# Patient Record
Sex: MALE | Race: BLACK OR AFRICAN AMERICAN | NOT HISPANIC OR LATINO | Employment: OTHER | URBAN - METROPOLITAN AREA
[De-identification: names, ages, dates, MRNs, and addresses within clinical notes are randomized per-mention and may not be internally consistent; named-entity substitution may affect disease eponyms.]

---

## 2021-03-30 ENCOUNTER — TELEPHONE (OUTPATIENT)
Dept: ENDOSCOPY | Facility: HOSPITAL | Age: 76
End: 2021-03-30

## 2021-03-30 DIAGNOSIS — C49.A0 MALIGNANT GASTROINTESTINAL STROMAL TUMOR, UNSPECIFIED SITE: Primary | ICD-10-CM

## 2021-03-31 ENCOUNTER — TELEPHONE (OUTPATIENT)
Dept: SURGERY | Facility: CLINIC | Age: 76
End: 2021-03-31

## 2021-04-01 ENCOUNTER — TELEPHONE (OUTPATIENT)
Dept: ENDOSCOPY | Facility: HOSPITAL | Age: 76
End: 2021-04-01

## 2021-04-06 ENCOUNTER — TELEPHONE (OUTPATIENT)
Dept: SURGERY | Facility: CLINIC | Age: 76
End: 2021-04-06

## 2021-04-08 ENCOUNTER — TELEPHONE (OUTPATIENT)
Dept: ENDOSCOPY | Facility: HOSPITAL | Age: 76
End: 2021-04-08

## 2021-04-08 RX ORDER — LOSARTAN POTASSIUM 100 MG/1
50 TABLET ORAL DAILY
COMMUNITY

## 2021-04-08 RX ORDER — ALLOPURINOL 100 MG/1
150 TABLET ORAL DAILY
COMMUNITY

## 2021-04-08 RX ORDER — UBIDECARENONE 75 MG
500 CAPSULE ORAL 2 TIMES DAILY
COMMUNITY

## 2021-04-08 RX ORDER — DOXAZOSIN 8 MG/1
8 TABLET ORAL NIGHTLY
COMMUNITY

## 2021-04-08 RX ORDER — ATORVASTATIN CALCIUM 40 MG/1
20 TABLET, FILM COATED ORAL NIGHTLY
COMMUNITY

## 2021-04-08 RX ORDER — AMLODIPINE BESYLATE 10 MG/1
10 TABLET ORAL DAILY
COMMUNITY

## 2021-04-08 RX ORDER — FERROUS SULFATE 325(65) MG
325 TABLET ORAL 2 TIMES DAILY
COMMUNITY

## 2021-04-08 RX ORDER — FINASTERIDE 5 MG/1
5 TABLET, FILM COATED ORAL DAILY
COMMUNITY

## 2021-04-08 RX ORDER — TAMSULOSIN HYDROCHLORIDE 0.4 MG/1
0.4 CAPSULE ORAL DAILY
COMMUNITY

## 2021-04-08 RX ORDER — CHOLECALCIFEROL (VITAMIN D3) 25 MCG
1000 TABLET ORAL DAILY
COMMUNITY

## 2021-04-08 RX ORDER — HYDROCHLOROTHIAZIDE 25 MG/1
25 TABLET ORAL DAILY
COMMUNITY

## 2021-04-08 RX ORDER — DARBEPOETIN ALFA 10 UG/.4ML
INJECTION, SOLUTION INTRAVENOUS; SUBCUTANEOUS
COMMUNITY

## 2021-04-08 RX ORDER — ERGOCALCIFEROL 1.25 MG/1
50000 CAPSULE ORAL
COMMUNITY

## 2021-04-09 ENCOUNTER — TELEPHONE (OUTPATIENT)
Dept: ENDOSCOPY | Facility: HOSPITAL | Age: 76
End: 2021-04-09

## 2021-04-09 DIAGNOSIS — C49.A0 GASTROINTESTINAL STROMAL TUMOR (GIST): ICD-10-CM

## 2021-04-09 DIAGNOSIS — Z99.2 DIALYSIS PATIENT: Primary | ICD-10-CM

## 2021-04-17 ENCOUNTER — LAB VISIT (OUTPATIENT)
Dept: FAMILY MEDICINE | Facility: CLINIC | Age: 76
End: 2021-04-17
Payer: MEDICARE

## 2021-04-17 DIAGNOSIS — C49.A0 GASTROINTESTINAL STROMAL TUMOR (GIST): ICD-10-CM

## 2021-04-17 PROCEDURE — U0005 INFEC AGEN DETEC AMPLI PROBE: HCPCS | Performed by: FAMILY MEDICINE

## 2021-04-17 PROCEDURE — U0003 INFECTIOUS AGENT DETECTION BY NUCLEIC ACID (DNA OR RNA); SEVERE ACUTE RESPIRATORY SYNDROME CORONAVIRUS 2 (SARS-COV-2) (CORONAVIRUS DISEASE [COVID-19]), AMPLIFIED PROBE TECHNIQUE, MAKING USE OF HIGH THROUGHPUT TECHNOLOGIES AS DESCRIBED BY CMS-2020-01-R: HCPCS | Performed by: FAMILY MEDICINE

## 2021-04-18 LAB — SARS-COV-2 RNA RESP QL NAA+PROBE: NOT DETECTED

## 2021-04-20 ENCOUNTER — OFFICE VISIT (OUTPATIENT)
Dept: SURGERY | Facility: CLINIC | Age: 76
End: 2021-04-20
Payer: MEDICARE

## 2021-04-20 ENCOUNTER — ANESTHESIA (OUTPATIENT)
Dept: ENDOSCOPY | Facility: HOSPITAL | Age: 76
End: 2021-04-20
Payer: MEDICARE

## 2021-04-20 ENCOUNTER — ANESTHESIA EVENT (OUTPATIENT)
Dept: ENDOSCOPY | Facility: HOSPITAL | Age: 76
End: 2021-04-20
Payer: MEDICARE

## 2021-04-20 ENCOUNTER — HOSPITAL ENCOUNTER (OUTPATIENT)
Facility: HOSPITAL | Age: 76
Discharge: HOME | End: 2021-04-20
Attending: INTERNAL MEDICINE | Admitting: INTERNAL MEDICINE
Payer: MEDICARE

## 2021-04-20 VITALS
OXYGEN SATURATION: 98 % | BODY MASS INDEX: 27.47 KG/M2 | DIASTOLIC BLOOD PRESSURE: 64 MMHG | HEIGHT: 67 IN | HEART RATE: 93 BPM | WEIGHT: 175 LBS | TEMPERATURE: 98 F | SYSTOLIC BLOOD PRESSURE: 121 MMHG | RESPIRATION RATE: 18 BRPM

## 2021-04-20 VITALS
OXYGEN SATURATION: 97 % | SYSTOLIC BLOOD PRESSURE: 123 MMHG | RESPIRATION RATE: 17 BRPM | HEIGHT: 67 IN | WEIGHT: 183.69 LBS | DIASTOLIC BLOOD PRESSURE: 66 MMHG | BODY MASS INDEX: 28.83 KG/M2 | HEART RATE: 82 BPM

## 2021-04-20 DIAGNOSIS — Z99.2 ESRD ON PERITONEAL DIALYSIS: ICD-10-CM

## 2021-04-20 DIAGNOSIS — N18.6 ESRD ON PERITONEAL DIALYSIS: ICD-10-CM

## 2021-04-20 DIAGNOSIS — C49.A2 MALIGNANT GASTROINTESTINAL STROMAL TUMOR (GIST) OF STOMACH: Primary | ICD-10-CM

## 2021-04-20 DIAGNOSIS — C49.A0 GIST (GASTROINTESTINAL STROMAL TUMOR), MALIGNANT: ICD-10-CM

## 2021-04-20 PROBLEM — E78.2 MIXED HYPERLIPIDEMIA: Status: ACTIVE | Noted: 2021-04-20

## 2021-04-20 PROBLEM — I15.0 RENOVASCULAR HYPERTENSION: Status: ACTIVE | Noted: 2021-04-20

## 2021-04-20 PROCEDURE — 99205 OFFICE O/P NEW HI 60 MIN: CPT | Mod: S$GLB,,, | Performed by: NURSE PRACTITIONER

## 2021-04-20 PROCEDURE — 37000008 HC ANESTHESIA 1ST 15 MINUTES: Performed by: INTERNAL MEDICINE

## 2021-04-20 PROCEDURE — 25000003 PHARM REV CODE 250: Performed by: NURSE ANESTHETIST, CERTIFIED REGISTERED

## 2021-04-20 PROCEDURE — 99999 PR PBB SHADOW E&M-EST. PATIENT-LVL IV: ICD-10-PCS | Mod: PBBFAC,,, | Performed by: NURSE PRACTITIONER

## 2021-04-20 PROCEDURE — 37000009 HC ANESTHESIA EA ADD 15 MINS: Performed by: INTERNAL MEDICINE

## 2021-04-20 PROCEDURE — 43259 PR ENDOSCOPIC ULTRASOUND EXAM: ICD-10-PCS | Mod: ,,, | Performed by: INTERNAL MEDICINE

## 2021-04-20 PROCEDURE — 63600175 PHARM REV CODE 636 W HCPCS: Performed by: NURSE ANESTHETIST, CERTIFIED REGISTERED

## 2021-04-20 PROCEDURE — 43259 EGD US EXAM DUODENUM/JEJUNUM: CPT | Mod: ,,, | Performed by: INTERNAL MEDICINE

## 2021-04-20 PROCEDURE — 43236 UPPR GI SCOPE W/SUBMUC INJ: CPT | Mod: 59,,, | Performed by: INTERNAL MEDICINE

## 2021-04-20 PROCEDURE — 27201028 HC NEEDLE, SCLERO: Performed by: INTERNAL MEDICINE

## 2021-04-20 PROCEDURE — 43259 EGD US EXAM DUODENUM/JEJUNUM: CPT | Performed by: INTERNAL MEDICINE

## 2021-04-20 PROCEDURE — 1126F PR PAIN SEVERITY QUANTIFIED, NO PAIN PRESENT: ICD-10-PCS | Mod: S$GLB,,, | Performed by: NURSE PRACTITIONER

## 2021-04-20 PROCEDURE — D9220A PRA ANESTHESIA: ICD-10-PCS | Mod: ANES,,, | Performed by: STUDENT IN AN ORGANIZED HEALTH CARE EDUCATION/TRAINING PROGRAM

## 2021-04-20 PROCEDURE — 43236 UPPR GI SCOPE W/SUBMUC INJ: CPT | Performed by: INTERNAL MEDICINE

## 2021-04-20 PROCEDURE — 1126F AMNT PAIN NOTED NONE PRSNT: CPT | Mod: S$GLB,,, | Performed by: NURSE PRACTITIONER

## 2021-04-20 PROCEDURE — 99999 PR PBB SHADOW E&M-EST. PATIENT-LVL IV: CPT | Mod: PBBFAC,,, | Performed by: NURSE PRACTITIONER

## 2021-04-20 PROCEDURE — 43236 PR EGD, FLEX, W/SUBMUC INJECTION(S), ANY SUBSTANCE: ICD-10-PCS | Mod: 59,,, | Performed by: INTERNAL MEDICINE

## 2021-04-20 PROCEDURE — D9220A PRA ANESTHESIA: Mod: CRNA,,, | Performed by: NURSE ANESTHETIST, CERTIFIED REGISTERED

## 2021-04-20 PROCEDURE — D9220A PRA ANESTHESIA: Mod: ANES,,, | Performed by: STUDENT IN AN ORGANIZED HEALTH CARE EDUCATION/TRAINING PROGRAM

## 2021-04-20 PROCEDURE — 99205 PR OFFICE/OUTPT VISIT, NEW, LEVL V, 60-74 MIN: ICD-10-PCS | Mod: S$GLB,,, | Performed by: NURSE PRACTITIONER

## 2021-04-20 PROCEDURE — D9220A PRA ANESTHESIA: ICD-10-PCS | Mod: CRNA,,, | Performed by: NURSE ANESTHETIST, CERTIFIED REGISTERED

## 2021-04-20 RX ORDER — LANOLIN ALCOHOL/MO/W.PET/CERES
CREAM (GRAM) TOPICAL
COMMUNITY
Start: 2021-01-26

## 2021-04-20 RX ORDER — LIDOCAINE HYDROCHLORIDE 20 MG/ML
INJECTION INTRAVENOUS
Status: DISCONTINUED | OUTPATIENT
Start: 2021-04-20 | End: 2021-04-20

## 2021-04-20 RX ORDER — SODIUM CHLORIDE 0.9 % (FLUSH) 0.9 %
10 SYRINGE (ML) INJECTION
Status: DISCONTINUED | OUTPATIENT
Start: 2021-04-20 | End: 2021-04-20 | Stop reason: HOSPADM

## 2021-04-20 RX ORDER — POTASSIUM CHLORIDE 1.5 G/1.58G
POWDER, FOR SOLUTION ORAL
COMMUNITY
Start: 2020-10-23

## 2021-04-20 RX ORDER — ONDANSETRON 2 MG/ML
4 INJECTION INTRAMUSCULAR; INTRAVENOUS ONCE AS NEEDED
Status: DISCONTINUED | OUTPATIENT
Start: 2021-04-20 | End: 2021-04-20 | Stop reason: HOSPADM

## 2021-04-20 RX ORDER — METOCLOPRAMIDE 5 MG/1
TABLET ORAL
COMMUNITY
Start: 2021-01-26

## 2021-04-20 RX ORDER — PROPOFOL 10 MG/ML
VIAL (ML) INTRAVENOUS
Status: DISCONTINUED | OUTPATIENT
Start: 2021-04-20 | End: 2021-04-20

## 2021-04-20 RX ORDER — ONDANSETRON 2 MG/ML
INJECTION INTRAMUSCULAR; INTRAVENOUS
Status: DISCONTINUED | OUTPATIENT
Start: 2021-04-20 | End: 2021-04-20

## 2021-04-20 RX ORDER — PROPOFOL 10 MG/ML
VIAL (ML) INTRAVENOUS CONTINUOUS PRN
Status: DISCONTINUED | OUTPATIENT
Start: 2021-04-20 | End: 2021-04-20

## 2021-04-20 RX ORDER — PHENYLEPHRINE HYDROCHLORIDE 10 MG/ML
INJECTION INTRAVENOUS
Status: DISCONTINUED | OUTPATIENT
Start: 2021-04-20 | End: 2021-04-20

## 2021-04-20 RX ORDER — EPHEDRINE SULFATE 50 MG/ML
INJECTION, SOLUTION INTRAVENOUS
Status: DISCONTINUED | OUTPATIENT
Start: 2021-04-20 | End: 2021-04-20

## 2021-04-20 RX ORDER — GENTAMICIN SULFATE 1 MG/G
CREAM TOPICAL
COMMUNITY
Start: 2021-02-23

## 2021-04-20 RX ORDER — FENTANYL CITRATE 50 UG/ML
INJECTION, SOLUTION INTRAMUSCULAR; INTRAVENOUS
Status: DISCONTINUED | OUTPATIENT
Start: 2021-04-20 | End: 2021-04-20

## 2021-04-20 RX ADMIN — PROPOFOL 20 MG: 10 INJECTION, EMULSION INTRAVENOUS at 09:04

## 2021-04-20 RX ADMIN — PHENYLEPHRINE HYDROCHLORIDE 200 MCG: 10 INJECTION INTRAVENOUS at 09:04

## 2021-04-20 RX ADMIN — PHENYLEPHRINE HYDROCHLORIDE 300 MCG: 10 INJECTION INTRAVENOUS at 09:04

## 2021-04-20 RX ADMIN — PHENYLEPHRINE HYDROCHLORIDE 100 MCG: 10 INJECTION INTRAVENOUS at 09:04

## 2021-04-20 RX ADMIN — SODIUM CHLORIDE: 0.9 INJECTION, SOLUTION INTRAVENOUS at 09:04

## 2021-04-20 RX ADMIN — PROPOFOL 175 MCG/KG/MIN: 10 INJECTION, EMULSION INTRAVENOUS at 09:04

## 2021-04-20 RX ADMIN — PHENYLEPHRINE HYDROCHLORIDE 400 MCG: 10 INJECTION INTRAVENOUS at 09:04

## 2021-04-20 RX ADMIN — FENTANYL CITRATE 50 MCG: 50 INJECTION, SOLUTION INTRAMUSCULAR; INTRAVENOUS at 09:04

## 2021-04-20 RX ADMIN — ONDANSETRON 4 MG: 2 INJECTION, SOLUTION INTRAMUSCULAR; INTRAVENOUS at 09:04

## 2021-04-20 RX ADMIN — LIDOCAINE HYDROCHLORIDE 100 MG: 20 INJECTION, SOLUTION INTRAVENOUS at 09:04

## 2021-04-20 RX ADMIN — PROPOFOL 50 MG: 10 INJECTION, EMULSION INTRAVENOUS at 09:04

## 2021-04-20 RX ADMIN — EPHEDRINE SULFATE 10 MG: 50 INJECTION INTRAVENOUS at 09:04

## 2021-04-20 RX ADMIN — GLYCOPYRROLATE 0.2 MG: 0.2 INJECTION, SOLUTION INTRAMUSCULAR; INTRAVITREAL at 09:04

## 2021-04-27 ENCOUNTER — PATIENT MESSAGE (OUTPATIENT)
Dept: SURGERY | Facility: CLINIC | Age: 76
End: 2021-04-27

## 2021-12-17 ENCOUNTER — HOSPITAL ENCOUNTER (OUTPATIENT)
Dept: RADIOLOGY | Facility: HOSPITAL | Age: 76
Discharge: HOME | End: 2021-12-17
Attending: NURSE PRACTITIONER
Payer: MEDICARE

## 2021-12-17 DIAGNOSIS — C49.A2 MALIGNANT GASTROINTESTINAL STROMAL TUMOR (GIST) OF STOMACH: ICD-10-CM

## 2021-12-17 PROCEDURE — 74183 MRI ABD W/O CNTR FLWD CNTR: CPT | Mod: TC,PO

## 2021-12-17 PROCEDURE — 25500020 PHARM REV CODE 255: Mod: PO | Performed by: NURSE PRACTITIONER

## 2021-12-17 PROCEDURE — A9585 GADOBUTROL INJECTION: HCPCS | Mod: PO | Performed by: NURSE PRACTITIONER

## 2021-12-17 RX ORDER — GADOBUTROL 604.72 MG/ML
6.5 INJECTION INTRAVENOUS
Status: COMPLETED | OUTPATIENT
Start: 2021-12-17 | End: 2021-12-17

## 2021-12-17 RX ADMIN — GADOBUTROL 6.5 ML: 604.72 INJECTION INTRAVENOUS at 03:12

## 2021-12-20 ENCOUNTER — OFFICE VISIT (OUTPATIENT)
Dept: SURGERY | Facility: CLINIC | Age: 76
End: 2021-12-20
Payer: MEDICARE

## 2021-12-20 DIAGNOSIS — Z99.2 ESRD ON PERITONEAL DIALYSIS: ICD-10-CM

## 2021-12-20 DIAGNOSIS — N28.89 RENAL MASS, LEFT: ICD-10-CM

## 2021-12-20 DIAGNOSIS — N18.6 ESRD ON PERITONEAL DIALYSIS: ICD-10-CM

## 2021-12-20 DIAGNOSIS — C49.A2 MALIGNANT GASTROINTESTINAL STROMAL TUMOR (GIST) OF STOMACH: Primary | ICD-10-CM

## 2021-12-20 PROCEDURE — 99213 OFFICE O/P EST LOW 20 MIN: CPT | Mod: 95,,, | Performed by: NURSE PRACTITIONER

## 2021-12-20 PROCEDURE — 1159F PR MEDICATION LIST DOCUMENTED IN MEDICAL RECORD: ICD-10-PCS | Mod: CPTII,95,, | Performed by: NURSE PRACTITIONER

## 2021-12-20 PROCEDURE — 99213 PR OFFICE/OUTPT VISIT, EST, LEVL III, 20-29 MIN: ICD-10-PCS | Mod: 95,,, | Performed by: NURSE PRACTITIONER

## 2021-12-20 PROCEDURE — 1159F MED LIST DOCD IN RCRD: CPT | Mod: CPTII,95,, | Performed by: NURSE PRACTITIONER

## 2021-12-20 RX ORDER — LACTULOSE 10 G/15ML
SOLUTION ORAL; RECTAL
COMMUNITY
Start: 2021-08-03

## 2021-12-20 RX ORDER — CINACALCET 30 MG/1
TABLET, FILM COATED ORAL
COMMUNITY
Start: 2021-01-20

## 2021-12-20 NOTE — PROGRESS NOTES
The patient location is: home  The chief complaint leading to consultation is: GIST surveillance    Visit type: audiovisual    Face to Face time with patient: 25 minutes  30 minutes of total time spent on the encounter, which includes face to face time and non-face to face time preparing to see the patient (eg, review of tests), Obtaining and/or reviewing separately obtained history, Documenting clinical information in the electronic or other health record, Independently interpreting results (not separately reported) and communicating results to the patient/family/caregiver, or Care coordination (not separately reported).     Each patient to whom he or she provides medical services by telemedicine is:  (1) informed of the relationship between the physician and patient and the respective role of any other health care provider with respect to management of the patient; and (2) notified that he or she may decline to receive medical services by telemedicine and may withdraw from such care at any time.    Encounter Date:  2021    Patient ID: Kurtis Plata  Age:  76 y.o. :  1945    Chief Complaint:  followup of low grade stomach GIST     3/2021: Given he is symptom-free and low grade pathology, plan for surveillance with repeat MRI abd in 6 months    Interval History:  Mr. Plata remains at home in [place], [place] with his daughter, Chandni, who is a nurse. In 2021, there was incidental finding of small mass on lesser curvature of stomach. This was biopsied and pathology revealed low grade GIST, without worrisome features. He was last seen as initial consult with Dr. Juarez and decided to observe GIST given risk of surgery with peritoneal dialysis.    Mr. Plata remains on PD for ESRD, night cycle. He continues to have a decreased appetite, usually eats 2 meals daily. Denies N/V. Denies abd pain. He lost 5# weight. Remains afebrile.   Surveillance imaging revealed stomach lesion is unchanged from 3/2021.  However, there is a finding of L renal mass.     + abd sx: PD catheter placement 2019  + family hx of cancer - father and brother    New Data:  Imaging:  I personally reviewed the following images:  12/17/2021: MRI ABD:  1. Unchanged 19 mm enhancing mucosal mass along lesser curvature of stomach, since 3/17/2021. This may represent reportedly known gist tumor. Correlation with prior endoscopy and presumed biopsy results is requested.    2. Evidence of iron deposition throughout the reticuloendothelial system involving the liver, spleen, and bone marrow, unchanged. Correlation for primary or secondary hemosiderosis is requested.    3. 4.7 cm inferior left renal enhancing mass characteristic of solid neoplasm. This represents malignancy such as renal cell carcinoma until proven otherwise. Urologic consultation is recommended.    4. Cystic dysplasia of bilateral kidneys.      Past Medical History:   Diagnosis Date    Anemia     BPH (benign prostatic hyperplasia)     CKD (chronic kidney disease)     Colon polyps     Dialysis patient     daily peritoneal    Dysphagia     Esophagitis     ESRD (end stage renal disease)     Gallbladder sludge     Gastritis     Gastrointestinal stromal tumor (GIST)     GERD (gastroesophageal reflux disease)     Gout     Hiatal hernia     HTN (hypertension)     Hyperlipidemia     Iron deficiency     Nephrosclerosis     Overweight     Proteinuria     Renal cysts, acquired, bilateral     Sleep apnea     Vitamin D deficiency      Past Surgical History:   Procedure Laterality Date    COLONOSCOPY W/ POLYPECTOMY  06/2017    ENDOSCOPIC ULTRASOUND OF UPPER GASTROINTESTINAL TRACT N/A 4/20/2021    Procedure: ULTRASOUND, UPPER GI TRACT, ENDOSCOPIC;  Surgeon: Anmol Joe MD;  Location: [facility] ([address] [place] [address]);  Service: Endoscopy;  Laterality: N/A;  Covid-19 test 4/17/21 at [place] - pg  Serum K+ before procedure (dialysis patient) - pg    ESOPHAGOGASTRODUODENOSCOPY  02/16/2021     PERITONEAL CATHETER INSERTION      RENAL BIOPSY  04/12/2017    VASECTOMY       Current Outpatient Medications on File Prior to Visit   Medication Sig Dispense Refill    allopurinoL (ZYLOPRIM) 100 MG tablet Take 150 mg by mouth once daily.      amLODIPine (NORVASC) 10 MG tablet Take 10 mg by mouth once daily.      atorvastatin (LIPITOR) 40 MG tablet Take 20 mg by mouth every evening.      cyanocobalamin 500 MCG tablet Take 500 mcg by mouth 2 (two) times a day.      darbepoetin lillian in polysorbat (ARANESP, IN POLYSORBATE,) 10 mcg/0.4 mL Syrg Inject as directed. Monthly.      doxazosin (CARDURA) 8 MG Tab Take 8 mg by mouth every evening.      ergocalciferol (ERGOCALCIFEROL) 50,000 unit Cap Take 50,000 Units by mouth every 14 (fourteen) days.      ferrous sulfate (FEOSOL) 325 mg (65 mg iron) Tab tablet Take 325 mg by mouth 2 (two) times daily.      finasteride (PROSCAR) 5 mg tablet Take 5 mg by mouth once daily.      gentamicin (GARAMYCIN) 0.1 % cream APPLY SMALL AMOUNT TOPICALLY DAILY FOR INFECTION      hydroCHLOROthiazide (HYDRODIURIL) 25 MG tablet Take 25 mg by mouth once daily.      losartan (COZAAR) 100 MG tablet Take 50 mg by mouth once daily.      magnesium oxide (MAG-OX) 400 mg (241.3 mg magnesium) tablet TAKE ONE TABLET BY MOUTH DAILY AS A MAGNESIUM SUPPLEMENT      metoclopramide HCl (REGLAN) 5 MG tablet TAKE ONE TABLET BY MOUTH EVERY NIGHT AT BEDTIME FOR STOMACH      potassium chloride (KLOR-CON) 20 mEq Pack TAKE 2 PACKETS BY MOUTH TWICE A DAY AS A POTASSIUM SUPPLEMENT TAKE TWICE A DAY FOR 5 DAYS AND THEN ONE A DAY AFTERWARDS WITH YOUT HCTZ      tamsulosin (FLOMAX) 0.4 mg Cap Take 0.4 mg by mouth once daily.      vitamin D (VITAMIN D3) 1000 units Tab Take 1,000 Units by mouth once daily.       No current facility-administered medications on file prior to visit.     Review of patient's allergies indicates:  No Known Allergies    Family History:  His family history includes Cancer in his  brother and father; Kidney disease in his father.     Social History:   reports that he has quit smoking. He does not have any smokeless tobacco history on file. He reports previous alcohol use.     ROS:    Pertinent positive/negatives detailed in HPI, all other systems negative.     Physical Exam: virtual visit only  Constitutional:  Non-toxic, no acute distress.   Eyes:  Sclerae anicteric, gaze symmetrical  Neck:  Trachea midline,  FROM  Resp:  Easy work of breathing  Neuro:  No gross facial deficits  Psych:  Awake, alert, oriented.  Answers and asks questions appropriately      ICD-10-CM ICD-9-CM    1. Malignant gastrointestinal stromal tumor (GIST) of stomach  C49.A2 151.9    2. ESRD on peritoneal dialysis  N18.6 585.6     Z99.2 V45.11    3. Renal mass, left  N28.89 593.9 Ambulatory referral/consult to Urology      CANCELED: Ambulatory referral/consult to Urology   Plan   This 75 y/o gentleman with CKD has been on PD for past 2 years. He had incidental finding of small stomach low grade GIST in 2/2021. He remains asymptomatic and is not interested in surgery at this time as he is concerned about interrupting PD. Repeat MRI abd for surveillance in anther 6 months.   Refer to urology at [facility] to discuss L kidney mass. Please notify his daughter when appt is scheduled.     Questions were asked and answered to patient's satisfaction.  We discussed the need for continued clinical/radiographic/endoscopic follow-up.      Follow up in about 6 months (around 6/20/2022). with MRI Abd          Mady Mederos NP  Upper GI / Hepatobiliary Surgical Oncology  [facility] [place], [place]  Office: [phone]  Fax: [phone]

## 2022-01-08 ENCOUNTER — PATIENT MESSAGE (OUTPATIENT)
Dept: SURGERY | Facility: CLINIC | Age: 77
End: 2022-01-08
Payer: MEDICARE

## 2022-01-10 DIAGNOSIS — C49.A2 MALIGNANT GASTROINTESTINAL STROMAL TUMOR (GIST) OF STOMACH: Primary | ICD-10-CM

## 2022-01-10 DIAGNOSIS — C49.A0 MALIGNANT GASTROINTESTINAL STROMAL TUMOR, UNSPECIFIED SITE: ICD-10-CM

## 2022-01-10 PROBLEM — N28.89 RENAL MASS, LEFT: Status: ACTIVE | Noted: 2021-12-20

## 2022-01-10 NOTE — TELEPHONE ENCOUNTER
Return pt daughter call and informed her that her father has an appt with Dr Ochoa (urologist) on Wednesday 01/12/2022 @ 10:15am.

## 2022-01-12 ENCOUNTER — OFFICE VISIT (OUTPATIENT)
Dept: UROLOGY | Facility: CLINIC | Age: 77
End: 2022-01-12
Payer: MEDICARE

## 2022-01-12 ENCOUNTER — HOSPITAL ENCOUNTER (OUTPATIENT)
Dept: RADIOLOGY | Facility: HOSPITAL | Age: 77
Discharge: HOME | End: 2022-01-12
Attending: UROLOGY
Payer: MEDICARE

## 2022-01-12 DIAGNOSIS — Z99.2 ESRD ON PERITONEAL DIALYSIS: ICD-10-CM

## 2022-01-12 DIAGNOSIS — N28.89 RENAL MASS, LEFT: ICD-10-CM

## 2022-01-12 DIAGNOSIS — N28.89 RENAL MASS, LEFT: Primary | ICD-10-CM

## 2022-01-12 DIAGNOSIS — N18.6 ESRD ON PERITONEAL DIALYSIS: ICD-10-CM

## 2022-01-12 PROCEDURE — 1160F PR REVIEW ALL MEDS BY PRESCRIBER/CLIN PHARMACIST DOCUMENTED: ICD-10-PCS | Mod: CPTII,S$GLB,, | Performed by: UROLOGY

## 2022-01-12 PROCEDURE — 99999 PR PBB SHADOW E&M-EST. PATIENT-LVL IV: ICD-10-PCS | Mod: PBBFAC,,, | Performed by: UROLOGY

## 2022-01-12 PROCEDURE — 71250 CT THORAX DX C-: CPT | Mod: TC,PO

## 2022-01-12 PROCEDURE — 1159F PR MEDICATION LIST DOCUMENTED IN MEDICAL RECORD: ICD-10-PCS | Mod: CPTII,S$GLB,, | Performed by: UROLOGY

## 2022-01-12 PROCEDURE — 1101F PR PT FALLS ASSESS DOC 0-1 FALLS W/OUT INJ PAST YR: ICD-10-PCS | Mod: CPTII,S$GLB,, | Performed by: UROLOGY

## 2022-01-12 PROCEDURE — 3288F PR FALLS RISK ASSESSMENT DOCUMENTED: ICD-10-PCS | Mod: CPTII,S$GLB,, | Performed by: UROLOGY

## 2022-01-12 PROCEDURE — 99205 PR OFFICE/OUTPT VISIT, NEW, LEVL V, 60-74 MIN: ICD-10-PCS | Mod: S$GLB,,, | Performed by: UROLOGY

## 2022-01-12 PROCEDURE — 99999 PR PBB SHADOW E&M-EST. PATIENT-LVL IV: CPT | Mod: PBBFAC,,, | Performed by: UROLOGY

## 2022-01-12 PROCEDURE — 1160F RVW MEDS BY RX/DR IN RCRD: CPT | Mod: CPTII,S$GLB,, | Performed by: UROLOGY

## 2022-01-12 PROCEDURE — 1159F MED LIST DOCD IN RCRD: CPT | Mod: CPTII,S$GLB,, | Performed by: UROLOGY

## 2022-01-12 PROCEDURE — 3288F FALL RISK ASSESSMENT DOCD: CPT | Mod: CPTII,S$GLB,, | Performed by: UROLOGY

## 2022-01-12 PROCEDURE — 1101F PT FALLS ASSESS-DOCD LE1/YR: CPT | Mod: CPTII,S$GLB,, | Performed by: UROLOGY

## 2022-01-12 PROCEDURE — 99205 OFFICE O/P NEW HI 60 MIN: CPT | Mod: S$GLB,,, | Performed by: UROLOGY

## 2022-01-12 NOTE — PROGRESS NOTES
"  Subjective:       Patient ID: Kurtis Plata is a 76 y.o. male.    Chief Complaint:  Renal mass.      History of Present Illness  HPI  Patient is a 76 y.o. male who is new to our clinic and referred by Mady Mederos NP for evaluation of a renal mass.   Patient denies h/o hematuria.  Has lost some weight, but attributed to poor appetite.   Also has ESRD, 2/2 HTN.  On peritoneal dialysis.   Father had kidney cancer (had surgery for this, passed from "old-age" unrelated to kidney cancer), brother colon CA, niece w/breast CA.    He sees Dr. Shari Mcknight in [place] as his nephrologist.       Review of Systems  Review of Systems  All other systems reviewed and negative except pertinent positives noted in HPI.       Objective:     Physical Exam  Constitutional:       General: He is not in acute distress.     Appearance: He is well-developed and well-nourished.   HENT:      Head: Normocephalic and atraumatic.   Eyes:      General: No scleral icterus.  Neck:      Trachea: No tracheal deviation.   Pulmonary:      Effort: Pulmonary effort is normal. No respiratory distress.   Abdominal:       Neurological:      Mental Status: He is alert and oriented to person, place, and time.   Psychiatric:         Mood and Affect: Mood and affect normal.         Behavior: Behavior normal.         Thought Content: Thought content normal.         Judgment: Judgment normal.         Lab Review  No results found for: COLORU, SPECGRAV, PHUR, WBCUR, NITRITE, PROTEINUR, GLUCOSEUR, KETONESU, UROBILINOGEN, BILIRUBINUR, RBCUR      Assessment:        1. Renal mass, left    2. ESRD on peritoneal dialysis            Plan:     Renal mass, left  -     Ambulatory referral/consult to Urology  -     CT Chest Without Contrast; Future; Expected date: 01/12/2022    ESRD on peritoneal dialysis    - Long talk about renal mass and it's management.  Reviewed images.Discussed options including active surveillance, biopsy, minimally invasive techniques including " HFRA, cryo. Discussed open and laparascopic surgical approaches. Discussed partial and radical nephrectomy. Discussed surgery preparation, surgery, recuperation, recovery, exercise restrictions. Discussed risks, benefits, and complications. Answered questions and addressed their concerns.    -plan CT chest.   --I have explained the risk, benefits, and alternatives of the procedure in detail.  The risks including but not limited to bleeding, injury to adjacent structures including the spleen, liver, lung, pancreas, colon and intestines, blood vessels in the abdomen including the renal artery or renal vein, or need for conversion to open nephrectomy were explained to the patient in depth. The patient voices understanding and all questions have been answered. The patient agrees to proceed as planned with a left robotic retroperitoneal nephrectomy (possible conversion to open or transperitoneal).  Will discuss plan with nephrologist in [place] as to whether we pre-emptively place an HD catheter just in case PD needs to be stopped (due to violation of peritoneum during retroperitoneal surgery OR conversion to transperitoneal approach)      -continue PD for ESRD

## 2022-02-01 ENCOUNTER — TELEPHONE (OUTPATIENT)
Dept: UROLOGY | Facility: CLINIC | Age: 77
End: 2022-02-01
Payer: MEDICARE

## 2022-02-01 DIAGNOSIS — N28.89 RENAL MASS, LEFT: Primary | ICD-10-CM

## 2022-02-08 NOTE — ANESTHESIA PAT ROS NOTE
02/08/2022  Kurtis Plata is a 77 y.o., male.      Pre-op Assessment          Review of Systems         Anesthesia Assessment: Preoperative EQUATION    Planned Procedure: Procedure(s) (LRB):  XI ROBOTIC NEPHRECTOMY/ RETROPERITONEAL (Left)  Requested Anesthesia Type:General  Surgeon: Janes Ochoa MD  Service: Urology  Known or anticipated Date of Surgery:3/24/2022    Surgeon notes: reviewed    Previous anesthesia records:GETA and No problems   4/2021 Upper GI US  Airway/Jaw/Neck:  Airway Findings: Mouth Opening: Normal Tongue: Normal  General Airway Assessment: Adult  Mallampati: II  TM Distance: Normal, at least 6 cm  Jaw/Neck Findings:     Neck ROM: Normal ROM       Last PCP note: outside [facility]   Subspecialty notes: Nephrology, Urology    Other important co-morbidities:Per Epic: GERD, HLD, HTN and ESRD/CKD, Gout      Tests already available:  No recent tests.      Optimization:  Anesthesia Preop Clinic Assessment  Indicated.    Medical Opinion Indicated.           Plan:    Testing:  CMP, EKG, Hematology Profile and T&S   Pre-anesthesia  visit       Visit focus: concerns in complex and/or prolonged anesthesia, position other than supine     Consultation:IM Perioperative Hospitalist     Patient  has previously scheduled Medical Appointment: 3/14/2022    Navigation: Tests Scheduled.              Consults scheduled.             Results will be tracked by Preop Clinic.

## 2022-02-14 ENCOUNTER — TELEPHONE (OUTPATIENT)
Dept: PREADMISSION TESTING | Facility: HOSPITAL | Age: 77
End: 2022-02-14
Payer: MEDICARE

## 2022-02-14 NOTE — TELEPHONE ENCOUNTER
----- Message from Edilma Corcoran RN sent at 2/8/2022 11:43 AM CST -----  Surgery date: 3/24/2022  PreOp appt: 3/14/2022    Please call Pt and schedule the following preop appts:    Ziggy  POC  Lab  EKG    Thank you!  Edilma

## 2022-03-08 ENCOUNTER — TELEPHONE (OUTPATIENT)
Dept: UROLOGY | Facility: CLINIC | Age: 77
End: 2022-03-08
Payer: MEDICARE

## 2022-03-08 NOTE — TELEPHONE ENCOUNTER
Josi Hearn,RN notified.    ----- Message from Amada Izaguirre LPN sent at 3/8/2022  3:06 PM CST -----  Hello,        I have sent a msg and LM for Karina  and tried for hrs this PM to ask for assistance in changing and appt for a pt coming in from out of town to no avail.   I did not get a call back and when I did go another direction, I spoke to ALEXANDRA Liz  and I was hung up on after holding on for Carley.  I have moved our appts to 3/22 and am hoping that Dr Ochoa's appt can also be changed so that the pt will not have to make 2 trips from [place] to come in for preop.    Thanks

## 2022-03-13 PROBLEM — G47.33 OSA (OBSTRUCTIVE SLEEP APNEA): Status: ACTIVE | Noted: 2022-03-13

## 2022-03-13 PROBLEM — I10 PRIMARY HYPERTENSION: Status: ACTIVE | Noted: 2022-03-13

## 2022-03-13 PROBLEM — Z86.2 HISTORY OF IRON DEFICIENCY ANEMIA: Status: ACTIVE | Noted: 2022-03-13

## 2022-03-13 PROBLEM — N40.0 BENIGN PROSTATIC HYPERPLASIA: Status: ACTIVE | Noted: 2022-03-13

## 2022-03-14 ENCOUNTER — OFFICE VISIT (OUTPATIENT)
Dept: UROLOGY | Facility: CLINIC | Age: 77
End: 2022-03-14
Payer: MEDICARE

## 2022-03-14 ENCOUNTER — HOSPITAL ENCOUNTER (OUTPATIENT)
Dept: CARDIOLOGY | Facility: CLINIC | Age: 77
Discharge: HOME | End: 2022-03-14
Payer: MEDICARE

## 2022-03-14 ENCOUNTER — OFFICE VISIT (OUTPATIENT)
Dept: INTERNAL MEDICINE | Facility: CLINIC | Age: 77
End: 2022-03-14
Payer: MEDICARE

## 2022-03-14 ENCOUNTER — RESEARCH ENCOUNTER (OUTPATIENT)
Dept: RESEARCH | Facility: HOSPITAL | Age: 77
End: 2022-03-14
Payer: MEDICARE

## 2022-03-14 VITALS
WEIGHT: 178 LBS | TEMPERATURE: 99 F | OXYGEN SATURATION: 98 % | BODY MASS INDEX: 29.66 KG/M2 | DIASTOLIC BLOOD PRESSURE: 61 MMHG | HEIGHT: 65 IN | HEART RATE: 78 BPM | SYSTOLIC BLOOD PRESSURE: 127 MMHG

## 2022-03-14 DIAGNOSIS — Z01.818 PREOPERATIVE TESTING: ICD-10-CM

## 2022-03-14 DIAGNOSIS — E87.6 HYPOKALEMIA: ICD-10-CM

## 2022-03-14 DIAGNOSIS — N28.89 RENAL MASS: ICD-10-CM

## 2022-03-14 DIAGNOSIS — E78.2 MIXED HYPERLIPIDEMIA: ICD-10-CM

## 2022-03-14 DIAGNOSIS — E88.09 HYPOALBUMINEMIA: ICD-10-CM

## 2022-03-14 DIAGNOSIS — G47.33 OSA (OBSTRUCTIVE SLEEP APNEA): ICD-10-CM

## 2022-03-14 DIAGNOSIS — N28.89 RENAL MASS, LEFT: ICD-10-CM

## 2022-03-14 DIAGNOSIS — E55.9 VITAMIN D DEFICIENCY: ICD-10-CM

## 2022-03-14 DIAGNOSIS — I10 PRIMARY HYPERTENSION: ICD-10-CM

## 2022-03-14 DIAGNOSIS — C49.A2 MALIGNANT GASTROINTESTINAL STROMAL TUMOR (GIST) OF STOMACH: ICD-10-CM

## 2022-03-14 DIAGNOSIS — N40.0 BENIGN PROSTATIC HYPERPLASIA, UNSPECIFIED WHETHER LOWER URINARY TRACT SYMPTOMS PRESENT: ICD-10-CM

## 2022-03-14 DIAGNOSIS — E53.8 VITAMIN B 12 DEFICIENCY: ICD-10-CM

## 2022-03-14 DIAGNOSIS — Z86.2 HISTORY OF IRON DEFICIENCY ANEMIA: ICD-10-CM

## 2022-03-14 DIAGNOSIS — N18.6 ESRD ON PERITONEAL DIALYSIS: ICD-10-CM

## 2022-03-14 DIAGNOSIS — Z01.818 PREOPERATIVE TESTING: Primary | ICD-10-CM

## 2022-03-14 DIAGNOSIS — Z99.2 ESRD ON PERITONEAL DIALYSIS: ICD-10-CM

## 2022-03-14 PROBLEM — M10.30 GOUT DUE TO RENAL IMPAIRMENT, UNSPECIFIED SITE: Status: ACTIVE | Noted: 2020-12-30

## 2022-03-14 PROBLEM — E83.42 HYPOMAGNESEMIA: Status: ACTIVE | Noted: 2022-03-14

## 2022-03-14 PROCEDURE — 1159F PR MEDICATION LIST DOCUMENTED IN MEDICAL RECORD: ICD-10-PCS | Mod: CPTII,S$GLB,, | Performed by: NURSE PRACTITIONER

## 2022-03-14 PROCEDURE — 93010 ELECTROCARDIOGRAM REPORT: CPT | Mod: S$GLB,,, | Performed by: INTERNAL MEDICINE

## 2022-03-14 PROCEDURE — 93010 EKG 12-LEAD: ICD-10-PCS | Mod: S$GLB,,, | Performed by: INTERNAL MEDICINE

## 2022-03-14 PROCEDURE — 99999 PR PBB SHADOW E&M-EST. PATIENT-LVL III: ICD-10-PCS | Mod: PBBFAC,,,

## 2022-03-14 PROCEDURE — 99999 PR PBB SHADOW E&M-EST. PATIENT-LVL V: ICD-10-PCS | Mod: PBBFAC,,, | Performed by: NURSE PRACTITIONER

## 2022-03-14 PROCEDURE — 1159F MED LIST DOCD IN RCRD: CPT | Mod: CPTII,S$GLB,, | Performed by: NURSE PRACTITIONER

## 2022-03-14 PROCEDURE — 3078F PR MOST RECENT DIASTOLIC BLOOD PRESSURE < 80 MM HG: ICD-10-PCS | Mod: CPTII,S$GLB,, | Performed by: NURSE PRACTITIONER

## 2022-03-14 PROCEDURE — 99214 OFFICE O/P EST MOD 30 MIN: CPT | Mod: S$GLB,,, | Performed by: NURSE PRACTITIONER

## 2022-03-14 PROCEDURE — 99999 PR PBB SHADOW E&M-EST. PATIENT-LVL V: CPT | Mod: PBBFAC,,, | Performed by: NURSE PRACTITIONER

## 2022-03-14 PROCEDURE — 3074F SYST BP LT 130 MM HG: CPT | Mod: CPTII,S$GLB,, | Performed by: NURSE PRACTITIONER

## 2022-03-14 PROCEDURE — 99999 PR PBB SHADOW E&M-EST. PATIENT-LVL III: CPT | Mod: PBBFAC,,,

## 2022-03-14 PROCEDURE — 3078F DIAST BP <80 MM HG: CPT | Mod: CPTII,S$GLB,, | Performed by: NURSE PRACTITIONER

## 2022-03-14 PROCEDURE — 1160F RVW MEDS BY RX/DR IN RCRD: CPT | Mod: CPTII,S$GLB,, | Performed by: NURSE PRACTITIONER

## 2022-03-14 PROCEDURE — 99499 NO LOS: ICD-10-PCS | Mod: S$GLB,,, | Performed by: UROLOGY

## 2022-03-14 PROCEDURE — 99499 UNLISTED E&M SERVICE: CPT | Mod: S$GLB,,, | Performed by: UROLOGY

## 2022-03-14 PROCEDURE — 99214 PR OFFICE/OUTPT VISIT, EST, LEVL IV, 30-39 MIN: ICD-10-PCS | Mod: S$GLB,,, | Performed by: NURSE PRACTITIONER

## 2022-03-14 PROCEDURE — 93005 EKG 12-LEAD: ICD-10-PCS | Mod: S$GLB,,, | Performed by: ANESTHESIOLOGY

## 2022-03-14 PROCEDURE — 93005 ELECTROCARDIOGRAM TRACING: CPT | Mod: S$GLB,,, | Performed by: ANESTHESIOLOGY

## 2022-03-14 PROCEDURE — 3074F PR MOST RECENT SYSTOLIC BLOOD PRESSURE < 130 MM HG: ICD-10-PCS | Mod: CPTII,S$GLB,, | Performed by: NURSE PRACTITIONER

## 2022-03-14 PROCEDURE — 1160F PR REVIEW ALL MEDS BY PRESCRIBER/CLIN PHARMACIST DOCUMENTED: ICD-10-PCS | Mod: CPTII,S$GLB,, | Performed by: NURSE PRACTITIONER

## 2022-03-14 RX ORDER — SODIUM CHLORIDE 9 MG/ML
INJECTION, SOLUTION INTRAVENOUS CONTINUOUS
Status: CANCELLED | OUTPATIENT
Start: 2022-03-14

## 2022-03-14 RX ORDER — HEPARIN SODIUM 5000 [USP'U]/ML
5000 INJECTION, SOLUTION INTRAVENOUS; SUBCUTANEOUS ONCE
Status: CANCELLED | OUTPATIENT
Start: 2022-03-14

## 2022-03-14 NOTE — H&P (VIEW-ONLY)
"Urology ([facility]) H&P for upcoming procedure  Staff:  Janes Ochoa MD    CC: right renal mass    HPI:  Kurtis Plata is a 77 y.o. male with a left renal mass.      Patient denies h/o hematuria.  Has lost some weight, but attributed to poor appetite.   Also has ESRD, 2/2 HTN.  On peritoneal dialysis nightly.    Father had kidney cancer (had surgery for this, passed from "old-age" unrelated to kidney cancer), brother colon CA, niece w/breast CA.     He sees Dr. Shari Mcknight in [place] as his nephrologist.     Renal Nephrometry score - 10ah, high complexity, 21.9% likelihood of a major complication    MRI 12/17/21 4.7 cm inferior left renal enhancing mass characteristic of solid neoplasm. Unchanged 19 mm enhancing mucosal mass along lesser curvature of stomach, since 3/17/2021. This may represent reportedly known gist tumor.    CT chest 1/12/22 No evidence of metastatic disease throughout the chest.    He will be undergoing preoperative labs, EKG, and preoperative testing after her Urology appointment today    No abdominal surgeries other than abdominal peritoneal catheter placement. He will need to not use his peritoneal catheter for 4 weeks following surgery. Needs placement of Trialysis line or Permacath.    ROS: Negative except for as stated above    Past Medical History:   Diagnosis Date    Anemia     BPH (benign prostatic hyperplasia)     CKD (chronic kidney disease)     Colon polyps     Dialysis patient     daily peritoneal    Dysphagia     Esophagitis     ESRD (end stage renal disease)     Gallbladder sludge     Gastritis     Gastrointestinal stromal tumor (GIST)     GERD (gastroesophageal reflux disease)     Gout     Hiatal hernia     HTN (hypertension)     Hyperlipidemia     Iron deficiency     Nephrosclerosis     Overweight     Proteinuria     Renal cysts, acquired, bilateral     Sleep apnea     Vitamin D deficiency        Past Surgical History:   Procedure Laterality Date "    COLONOSCOPY W/ POLYPECTOMY  06/2017    ENDOSCOPIC ULTRASOUND OF UPPER GASTROINTESTINAL TRACT N/A 4/20/2021    Procedure: ULTRASOUND, UPPER GI TRACT, ENDOSCOPIC;  Surgeon: Anmol Joe MD;  Location: [facility] ([address] [place] [address]);  Service: Endoscopy;  Laterality: N/A;  Covid-19 test 4/17/21 at [place] - pg  Serum K+ before procedure (dialysis patient) - pg    ESOPHAGOGASTRODUODENOSCOPY  02/16/2021    PERITONEAL CATHETER INSERTION      RENAL BIOPSY  04/12/2017    VASECTOMY         Social History     Socioeconomic History    Marital status:    Tobacco Use    Smoking status: Former Smoker   Substance and Sexual Activity    Alcohol use: Not Currently       Family History   Problem Relation Age of Onset    Cancer Father     Kidney disease Father     Cancer Brother        Review of patient's allergies indicates:  No Known Allergies    Current Outpatient Medications on File Prior to Visit   Medication Sig Dispense Refill    allopurinoL (ZYLOPRIM) 100 MG tablet Take 150 mg by mouth once daily.      amLODIPine (NORVASC) 10 MG tablet Take 10 mg by mouth once daily.      atorvastatin (LIPITOR) 40 MG tablet Take 20 mg by mouth every evening.      cinacalcet (SENSIPAR) 30 MG Tab TAKE ONE TABLET BY MOUTH DAILY FOR CHRONIC KIDNEY DISEASE. TAKE WITH FOOD.      cyanocobalamin 500 MCG tablet Take 500 mcg by mouth 2 (two) times a day.      darbepoetin lillian in polysorbat (ARANESP, IN POLYSORBATE,) 10 mcg/0.4 mL Syrg Inject as directed. Monthly.      doxazosin (CARDURA) 8 MG Tab Take 8 mg by mouth every evening.      ergocalciferol (ERGOCALCIFEROL) 50,000 unit Cap Take 50,000 Units by mouth every 14 (fourteen) days.      ferrous sulfate (FEOSOL) 325 mg (65 mg iron) Tab tablet Take 325 mg by mouth 2 (two) times daily.      finasteride (PROSCAR) 5 mg tablet Take 5 mg by mouth once daily.      gentamicin (GARAMYCIN) 0.1 % cream APPLY SMALL AMOUNT TOPICALLY DAILY FOR INFECTION      hydroCHLOROthiazide (HYDRODIURIL)  "25 MG tablet Take 25 mg by mouth once daily.      lactulose (CHRONULAC) 10 gram/15 mL solution TAKE 20GM/30ML BY MOUTH EVERY 6 HOURS AS NEEDED FOR CONSTIPATION * TAKE 2 TABLESPOONFULS AS DIRECTED *      losartan (COZAAR) 100 MG tablet Take 50 mg by mouth once daily.      magnesium oxide (MAG-OX) 400 mg (241.3 mg magnesium) tablet TAKE ONE TABLET BY MOUTH DAILY AS A MAGNESIUM SUPPLEMENT      metoclopramide HCl (REGLAN) 5 MG tablet TAKE ONE TABLET BY MOUTH EVERY NIGHT AT BEDTIME FOR STOMACH      potassium chloride (KLOR-CON) 20 mEq Pack TAKE 2 PACKETS BY MOUTH TWICE A DAY AS A POTASSIUM SUPPLEMENT TAKE TWICE A DAY FOR 5 DAYS AND THEN ONE A DAY AFTERWARDS WITH YOUT HCTZ      tamsulosin (FLOMAX) 0.4 mg Cap Take 0.4 mg by mouth once daily.      vitamin D (VITAMIN D3) 1000 units Tab Take 1,000 Units by mouth once daily.       No current facility-administered medications on file prior to visit.       Anticoagulation:  None    Physical Exam:  Estimated body mass index is 28.04 kg/m² (pended) as calculated from the following:    Height as of 1/12/22: (P) 5' 7" (1.702 m).    Weight as of 1/12/22: (P) 81.2 kg (179 lb 0.2 oz).     General: No acute distress, well developed. AAOx3  Head: Normocephalic, Atraumatic  Eyes: Extra-occular movements intact, No discharge  Neck: supple, symmetrical, trachea midline  Lungs: normal respiratory effort, no respiratory distress, no wheezes  CV: regular rate, 2+ pulses  Abdomen: soft, non-tender, non-distended, no organomegaly. Peritoneal catheter LLQ   MSK: no edema, no deformities, normal ROM  Skin: skin color, texture, turgor normal.  Neurologic: no focal deficits, sensation intact     Labs:    Urine dipstick today shows negative for all components.          BMP  Lab Results   Component Value Date    K 3.9 04/20/2021       Imaging:   MRI AP from 12/17/21 - left renal mass, 4.7 cm, enhancing at lower pole, one left renal artery, one left renal veins.    Chest imaging: CXR 1/12/22 - " negative for metastatic disease    Assessment: Kurtis Plata is a 77 y.o. male with left renal mass, suspicious for [id number] RCC    Plan:     1. To OR on 3/24/22 for left retroperitoneal robotic nephrectomy  2. The patient is scheduled for a left retroperitoneal robotic nephrectomy, possible open. The risks and benefits of nephrectomy were discussed with the patient in detail. The risks include but are not limited to bleeding possibly requiring a blood transfusion, infection, pain, injury to internal organs such as lung, liver, spleen, bowel, adrenal, incomplete removal of tumor if present. Risks may vary depending on reasons for removal of kidney, persistent flank pain or hernia at area of incision, urinary infection requiring antibiotics, air embolus, pulmonary embolus and possible conversion to an open surgery. The patient will also be consented for blood. Patient understands these risks and has agreed to proceed with surgery. Consent was obtained.  3. Type and screen ordered preoperatively. The risks, benefits, and indications of a blood transfusion were discussed. The patient was given a chance to ask questions and all questions answered to his satisfaction. Consent obtained.   4. The risks and benefits of participating in our clinical trial have been discussed and the patient has consented for the research study here at [facility].  5. CBC, CMP, EKG, and preoperative appointment with Oliverio Monet today   6. He will need to have a permacath placed by Nephrology or a Trialysis line placed in the OR. He travon jnot be using his peritoneal catheter for 4 weeks s/p left nephrectomy    Keny Sexton MD

## 2022-03-14 NOTE — ASSESSMENT & PLAN NOTE
"  Dr. Juarez/ [facility]  Watching tumor- think it is slow growing  " incidental finding of small stomach low grade GIST in 2/2021. He remains asymptomatic and is not interested in surgery at this time as he is concerned about interrupting PD. Repeat MRI abd for surveillance in anther 6 months".     "

## 2022-03-14 NOTE — PROGRESS NOTES
"Urology ([facility]) H&P for upcoming procedure  Staff:  Janes Ochoa MD    CC: right renal mass    HPI:  Kurtis Plata is a 77 y.o. male with a left renal mass.      Patient denies h/o hematuria.  Has lost some weight, but attributed to poor appetite.   Also has ESRD, 2/2 HTN.  On peritoneal dialysis nightly.    Father had kidney cancer (had surgery for this, passed from "old-age" unrelated to kidney cancer), brother colon CA, niece w/breast CA.     He sees Dr. Shari Mcknight in [place] as his nephrologist.     Renal Nephrometry score - 10ah, high complexity, 21.9% likelihood of a major complication    MRI 12/17/21 4.7 cm inferior left renal enhancing mass characteristic of solid neoplasm. Unchanged 19 mm enhancing mucosal mass along lesser curvature of stomach, since 3/17/2021. This may represent reportedly known gist tumor.    CT chest 1/12/22 No evidence of metastatic disease throughout the chest.    He will be undergoing preoperative labs, EKG, and preoperative testing after her Urology appointment today    No abdominal surgeries other than abdominal peritoneal catheter placement. He will need to not use his peritoneal catheter for 4 weeks following surgery. Needs placement of Trialysis line or Permacath.    ROS: Negative except for as stated above    Past Medical History:   Diagnosis Date    Anemia     BPH (benign prostatic hyperplasia)     CKD (chronic kidney disease)     Colon polyps     Dialysis patient     daily peritoneal    Dysphagia     Esophagitis     ESRD (end stage renal disease)     Gallbladder sludge     Gastritis     Gastrointestinal stromal tumor (GIST)     GERD (gastroesophageal reflux disease)     Gout     Hiatal hernia     HTN (hypertension)     Hyperlipidemia     Iron deficiency     Nephrosclerosis     Overweight     Proteinuria     Renal cysts, acquired, bilateral     Sleep apnea     Vitamin D deficiency        Past Surgical History:   Procedure Laterality Date "    COLONOSCOPY W/ POLYPECTOMY  06/2017    ENDOSCOPIC ULTRASOUND OF UPPER GASTROINTESTINAL TRACT N/A 4/20/2021    Procedure: ULTRASOUND, UPPER GI TRACT, ENDOSCOPIC;  Surgeon: Anmol Joe MD;  Location: [facility] ([address] [place] [address]);  Service: Endoscopy;  Laterality: N/A;  Covid-19 test 4/17/21 at [facility] - pg  Serum K+ before procedure (dialysis patient) - pg    ESOPHAGOGASTRODUODENOSCOPY  02/16/2021    PERITONEAL CATHETER INSERTION      RENAL BIOPSY  04/12/2017    VASECTOMY         Social History     Socioeconomic History    Marital status:    Tobacco Use    Smoking status: Former Smoker   Substance and Sexual Activity    Alcohol use: Not Currently       Family History   Problem Relation Age of Onset    Cancer Father     Kidney disease Father     Cancer Brother        Review of patient's allergies indicates:  No Known Allergies    Current Outpatient Medications on File Prior to Visit   Medication Sig Dispense Refill    allopurinoL (ZYLOPRIM) 100 MG tablet Take 150 mg by mouth once daily.      amLODIPine (NORVASC) 10 MG tablet Take 10 mg by mouth once daily.      atorvastatin (LIPITOR) 40 MG tablet Take 20 mg by mouth every evening.      cinacalcet (SENSIPAR) 30 MG Tab TAKE ONE TABLET BY MOUTH DAILY FOR CHRONIC KIDNEY DISEASE. TAKE WITH FOOD.      cyanocobalamin 500 MCG tablet Take 500 mcg by mouth 2 (two) times a day.      darbepoetin lillian in polysorbat (ARANESP, IN POLYSORBATE,) 10 mcg/0.4 mL Syrg Inject as directed. Monthly.      doxazosin (CARDURA) 8 MG Tab Take 8 mg by mouth every evening.      ergocalciferol (ERGOCALCIFEROL) 50,000 unit Cap Take 50,000 Units by mouth every 14 (fourteen) days.      ferrous sulfate (FEOSOL) 325 mg (65 mg iron) Tab tablet Take 325 mg by mouth 2 (two) times daily.      finasteride (PROSCAR) 5 mg tablet Take 5 mg by mouth once daily.      gentamicin (GARAMYCIN) 0.1 % cream APPLY SMALL AMOUNT TOPICALLY DAILY FOR INFECTION      hydroCHLOROthiazide (HYDRODIURIL)  "25 MG tablet Take 25 mg by mouth once daily.      lactulose (CHRONULAC) 10 gram/15 mL solution TAKE 20GM/30ML BY MOUTH EVERY 6 HOURS AS NEEDED FOR CONSTIPATION * TAKE 2 TABLESPOONFULS AS DIRECTED *      losartan (COZAAR) 100 MG tablet Take 50 mg by mouth once daily.      magnesium oxide (MAG-OX) 400 mg (241.3 mg magnesium) tablet TAKE ONE TABLET BY MOUTH DAILY AS A MAGNESIUM SUPPLEMENT      metoclopramide HCl (REGLAN) 5 MG tablet TAKE ONE TABLET BY MOUTH EVERY NIGHT AT BEDTIME FOR STOMACH      potassium chloride (KLOR-CON) 20 mEq Pack TAKE 2 PACKETS BY MOUTH TWICE A DAY AS A POTASSIUM SUPPLEMENT TAKE TWICE A DAY FOR 5 DAYS AND THEN ONE A DAY AFTERWARDS WITH YOUT HCTZ      tamsulosin (FLOMAX) 0.4 mg Cap Take 0.4 mg by mouth once daily.      vitamin D (VITAMIN D3) 1000 units Tab Take 1,000 Units by mouth once daily.       No current facility-administered medications on file prior to visit.       Anticoagulation:  None    Physical Exam:  Estimated body mass index is 28.04 kg/m² (pended) as calculated from the following:    Height as of 1/12/22: (P) 5' 7" (1.702 m).    Weight as of 1/12/22: (P) 81.2 kg (179 lb 0.2 oz).     General: No acute distress, well developed. AAOx3  Head: Normocephalic, Atraumatic  Eyes: Extra-occular movements intact, No discharge  Neck: supple, symmetrical, trachea midline  Lungs: normal respiratory effort, no respiratory distress, no wheezes  CV: regular rate, 2+ pulses  Abdomen: soft, non-tender, non-distended, no organomegaly. Peritoneal catheter LLQ   MSK: no edema, no deformities, normal ROM  Skin: skin color, texture, turgor normal.  Neurologic: no focal deficits, sensation intact     Labs:    Urine dipstick today shows negative for all components.          BMP  Lab Results   Component Value Date    K 3.9 04/20/2021       Imaging:   MRI AP from 12/17/21 - left renal mass, 4.7 cm, enhancing at lower pole, one left renal artery, one left renal veins.    Chest imaging: CXR 1/12/22 - " negative for metastatic disease    Assessment: Kurtis Plata is a 77 y.o. male with left renal mass, suspicious for [id number] RCC    Plan:     1. To OR on 3/24/22 for left retroperitoneal robotic nephrectomy  2. The patient is scheduled for a left retroperitoneal robotic nephrectomy, possible open. The risks and benefits of nephrectomy were discussed with the patient in detail. The risks include but are not limited to bleeding possibly requiring a blood transfusion, infection, pain, injury to internal organs such as lung, liver, spleen, bowel, adrenal, incomplete removal of tumor if present. Risks may vary depending on reasons for removal of kidney, persistent flank pain or hernia at area of incision, urinary infection requiring antibiotics, air embolus, pulmonary embolus and possible conversion to an open surgery. The patient will also be consented for blood. Patient understands these risks and has agreed to proceed with surgery. Consent was obtained.  3. Type and screen ordered preoperatively. The risks, benefits, and indications of a blood transfusion were discussed. The patient was given a chance to ask questions and all questions answered to his satisfaction. Consent obtained.   4. The risks and benefits of participating in our clinical trial have been discussed and the patient has consented for the research study here at [facility].  5. CBC, CMP, EKG, and preoperative appointment with Oliverio Monet today   6. He will need to have a permacath placed by Nephrology or a Trialysis line placed in the OR. He travon jnot be using his peritoneal catheter for 4 weeks s/p left nephrectomy    Keny Sexton MD

## 2022-03-14 NOTE — ASSESSMENT & PLAN NOTE
Current /70  today. Repeat 127/61   Taking: Norvasc 10 mg, Hydrodiuril 25mg, Losartan 100mg  Patient reports home BP readings of: 140/  Encouraged keeping a healthy weight and BMI  Lifestyle changes to reduce systolic BP:  exercise 30 minutes per day,  5 days per week or 150 minutes weekly; sodium reduction and avoidance of high salt foods such as processed meats, frozen meals and  fast foods.     BP acceptable for surgery. I recommend monitoring BP during perioperative period as well as uncontrolled pain which can elevated blood pressure.

## 2022-03-14 NOTE — ASSESSMENT & PLAN NOTE
This client has a possible diagnosis of obstructive sleep apnea (ADEEL)    Instructions were given to avoid the following: sleeping supine, weight gain ,alcoholic beverages , sedative medications, and CNS depressant use as these can all worsen ADEEL.    Untreated sleep apnea has been shown to increase daytime sleepiness, hypertension, heart disease and stroke which were discussed with the patient at this time    Please use caution with medications that induce respiratory depression in the perioperative period

## 2022-03-14 NOTE — PATIENT INSTRUCTIONS
Preventive perioperative care    Thromboembolic prophylaxis:  His risk factors for thrombosis include surgical procedure, age and reduced mobility.I suggest  thromboembolic prophylaxis ( mechanical/pharmacological, weighing the risk benefits of pharmacological agent use considering gustavo procedural bleeding )  during the perioperative period.I suggested being active in the post operative period.      Postoperative pulmonary complication prophylaxis-Risk factors for post operative pulmonary complications include age over 65 years, surgery lasting over 3 hours and ASA class >2- I suggest incentive spirometry use, early ambulation and pain control so as to avoid diaphragmatic splinting  Brush teeth twice per day, oral rinses, sleep with the head of the bed up 30 degrees     Renal complication prophylaxis-Risk factors for renal complications include pre-existing renal disease, age and hypertension . I suggest keeping him well hydrated and avoidance/ minimizing the use of  NSAID's,DAILY 2 Inhibitors ,IV contrast if possible in the perioperative period.I suggested drinking 2 litre's of water a day      Surgical site Infection Prophylaxis-I  suggest appropriate antibiotic for Prophylaxis against Surgical site infections Shower with Dial Soap Antibacterial or  Hibiclens in the night before surgery and the morning of surgery       In view of urological procedure the patient  is at risk of postoperative urinary retention.  I suggest avoidance / minimizing the of  Benzodiazepines,Anticholinergic medication,antihistamines ( Benadryl) , if possible in the perioperative period. I suggest using the minimum possible use of opioids for the minimum period of time in the perioperative period. Benadryl avoidance suggested      This visit was focused on Preoperative evaluation, Perioperative Medical management, complication reduction plans. I suggest that the patient follows up with primary care or relevant sub specialists for ongoing  health care.    I appreciate the opportunity to be involved in this patients care. Please feel free to contact me if there were any questions about this consultation.    Patient is pending optimization

## 2022-03-14 NOTE — OUTPATIENT SUBJECTIVE & OBJECTIVE
Outpatient Subjective & Objective      Chief Complaint: Preoperative evaulation, perioperative medical management, and complication reduction plan.     Functional Capacity: Able to climb aflight of stairs without CP SOB or Syncope.  Able to meet 4 METS.  Walks, picks up grandchild,       Anesthesia issues: None    Difficulty mouth opening:none    Steroid use in the last 12 months:  none    Dental Issues: dentures    Family anesthesia difficulty: None     Family Hx of Thrombosis none    Past Medical History:   Diagnosis Date    Anemia     BPH (benign prostatic hyperplasia)     CKD (chronic kidney disease)     Colon polyps     Dialysis patient     daily peritoneal    Dysphagia     Esophagitis     ESRD (end stage renal disease)     Gallbladder sludge     Gastritis     Gastrointestinal stromal tumor (GIST)     GERD (gastroesophageal reflux disease)     Gout     Hiatal hernia     HTN (hypertension)     Hyperlipidemia     Iron deficiency     Nephrosclerosis     Overweight     Proteinuria     Renal cysts, acquired, bilateral     Sleep apnea     Vitamin D deficiency          Past Surgical History:   Procedure Laterality Date    COLONOSCOPY W/ POLYPECTOMY  06/2017    ENDOSCOPIC ULTRASOUND OF UPPER GASTROINTESTINAL TRACT N/A 4/20/2021    Procedure: ULTRASOUND, UPPER GI TRACT, ENDOSCOPIC;  Surgeon: Anmol Joe MD;  Location: [address]);  Service: Endoscopy;  Laterality: N/A;  Covid-19 test 4/17/21 at [place] - pg  Serum K+ before procedure (dialysis patient) - pg    ESOPHAGOGASTRODUODENOSCOPY  02/16/2021    PERITONEAL CATHETER INSERTION      RENAL BIOPSY  04/12/2017    VASECTOMY         Review of Systems   Constitutional: Positive for appetite change, fatigue and unexpected weight change. Negative for activity change, chills, diaphoresis and fever.   HENT: Negative for congestion, dental problem, drooling, trouble swallowing and voice change.         False teeth are little loose now  "  Eyes: Negative for photophobia and visual disturbance.        No acute visual changes   Respiratory: Negative for apnea, cough, choking, chest tightness, shortness of breath, wheezing and stridor.         Has ADEEL- wear CPAP   Cardiovascular: Positive for leg swelling. Negative for chest pain and palpitations.   Gastrointestinal: Positive for constipation. Negative for abdominal pain, blood in stool, diarrhea, nausea and vomiting.        No FLD, Hepatitis, Cirrhosis  No BRB or black tarry stool   Genitourinary: Negative for difficulty urinating, dysuria, frequency, hematuria and urgency.   Musculoskeletal: Negative for arthralgias, gait problem, myalgias, neck pain and neck stiffness.   Neurological: Positive for numbness. Negative for dizziness, seizures, syncope, light-headedness and headaches.   Psychiatric/Behavioral: Negative for suicidal ideas. The patient is not nervous/anxious.       VITALS  Visit Vitals  /61 (BP Location: Left arm, Patient Position: Sitting)   Pulse 78   Temp 99.4 °F (37.4 °C) (Oral)   Ht 5' 5" (1.651 m)   Wt 80.7 kg (178 lb)   SpO2 98%   BMI 29.62 kg/m²          Physical Exam  Constitutional:       General: He is not in acute distress.     Appearance: He is well-developed. He is not diaphoretic.   HENT:      Head: Normocephalic.      Right Ear: Decreased hearing noted.      Left Ear: Decreased hearing noted.      Nose: Nose normal.      Mouth/Throat:      Lips: Pink.      Comments: Full dentures  Eyes:      General: Lids are normal.      Conjunctiva/sclera: Conjunctivae normal.      Pupils: Pupils are equal, round, and reactive to light.   Neck:      Trachea: Trachea and phonation normal.   Cardiovascular:      Rate and Rhythm: Normal rate and regular rhythm.      Pulses:           Carotid pulses are 2+ on the right side and 2+ on the left side.       Radial pulses are 2+ on the right side and 2+ on the left side.        Posterior tibial pulses are 2+ on the right side and 2+ on the " left side.      Heart sounds: Normal heart sounds. No murmur heard.    No friction rub. No gallop.      Comments: Trace le edema  Pulmonary:      Effort: Pulmonary effort is normal.      Breath sounds: Normal breath sounds.      Comments: Fine crackles to lung bases - cleared with  cough  Abdominal:      General: Bowel sounds are normal. There is no distension.      Palpations: Abdomen is soft.      Tenderness: There is no abdominal tenderness.       Musculoskeletal:         General: No tenderness or deformity. Normal range of motion.      Cervical back: Normal range of motion.      Right lower leg: No edema.      Left lower leg: No edema.   Lymphadenopathy:      Head:      Right side of head: No submental, submandibular, tonsillar, preauricular, posterior auricular or occipital adenopathy.      Left side of head: No submental, submandibular, tonsillar, preauricular, posterior auricular or occipital adenopathy.      Cervical:      Right cervical: No superficial cervical adenopathy.     Left cervical: No superficial cervical adenopathy.   Skin:     General: Skin is warm and dry.      Capillary Refill: Capillary refill takes 2 to 3 seconds.      Coloration: Skin is not pale.      Findings: No erythema or rash.   Neurological:      Mental Status: He is alert and oriented to person, place, and time.      GCS: GCS eye subscore is 4. GCS verbal subscore is 5. GCS motor subscore is 6.      Motor: No abnormal muscle tone.      Coordination: Coordination normal.   Psychiatric:         Attention and Perception: Attention and perception normal.         Mood and Affect: Mood and affect normal.         Speech: Speech normal.         Behavior: Behavior normal. Behavior is cooperative.         Thought Content: Thought content normal.         Cognition and Memory: Cognition and memory normal.         Judgment: Judgment normal.          Significant Labs:  Lab Results   Component Value Date    WBC 7.84 03/14/2022    HGB 9.0 (L)  03/14/2022    HCT 30.8 (L) 03/14/2022     03/14/2022    ALT 14 03/14/2022    AST 16 03/14/2022     03/14/2022    K 3.4 (L) 03/14/2022     03/14/2022    CREATININE 5.8 (H) 03/14/2022    BUN 30 (H) 03/14/2022    CO2 30 (H) 03/14/2022       Diagnostic Studies: No relevant studies.    EKG:   Results for orders placed or performed during the hospital encounter of 03/14/22   EKG 12-lead    Collection Time: 03/14/22  1:26 PM    Narrative    Test Reason : Z01.818,    Vent. Rate : 076 BPM     Atrial Rate : 076 BPM     P-R Int : 142 ms          QRS Dur : 088 ms      QT Int : 406 ms       P-R-T Axes : 074 014 -71 degrees     QTc Int : 456 ms    Normal sinus rhythm  Nonspecific ST and T wave abnormality  Abnormal ECG  No previous ECGs available  Confirmed by Carson Stanton MD ([id number]) on 3/14/2022 3:21:11 PM    Referred By: DENG PEÑA           Confirmed By:Carson Stanton MD       2D ECHO:  TTE:  No results found for this or any previous visit.    JOE:  No results found for this or any previous visit.     Imaging   · Result Note    · 1 Patient Communication    Details    Reading Physician Reading Date Result Priority   Chucho Juárez MD  [phone] 1/12/2022      Narrative & Impression  CMS MANDATED QUALITY DATA - CT RADIATION - 436     All CT scans at this facility utilize dose modulation, iterative reconstruction, and/or weight based dosing when appropriate to reduce radiation dose to as low as reasonably achievable.     Reason: renal mass, rcc suspected, rule out mets. RCC Suspected, recent Abd MRI, R/O mets to chest prior to surgery.     TECHNIQUE: CT thorax without IV contrast.     COMPARISON: MRI 12/17/2021     CT THORAX:  Linear opacities affect the medial left lower and, to a lesser extent, right lower lobe suggesting scarring or atelectasis. Lungs are otherwise clear. No pulmonary nodules or masses. Trachea and main bronchi are patent.     No pleural or pericardial effusion. No enlarged  mediastinal or axillary lymph nodes. Coronary artery calcifications are diffuse.     Partially visualized upper abdomen shows hyperdense liver parenchyma and small volume of intra-abdominal ascites. Hypodensity in superior right kidney is incompletely visualized, correlating with a cyst on prior MRI.     IMPRESSION:     1. No evidence of metastatic disease throughout the chest.  2. Coronary artery calcifications.  3. Tiny volume ascites in the partially visualized upper abdomen.  4. Hyperdense liver parenchyma can be seen with passive congestion from right heart failure, hemochromatosis, or chronic amiodarone therapy.     Electronically signed by:  Chucho Juárez MD  1/12/2022 12:49 PM [facility] Workstation: [id number]             Specimen Collected: 01/12/22 11:43 Last Resulted: 01/12/22 12:49                Active Cardiac Conditions: None      Revised Cardiac Risk Index   High -Risk Surgery  Intraperitoneal; Intrathoracic; suprainguinal vascular Yes- + 1 No- 0   History of Ischemic Heart Disease   (Hx of MI/positive exercise test/current chest pain due to ischemia/use of nitrate therapy/EKG with pathological Q waves) Yes- + 1 No- 0   History of CHF  (Pulmonary edema/bilateral rales or S3 gallop/PND/CXR showing pulmonary vascular redistribution) Yes- + 1 No- 0   History of CVA   (Prior stroke or TIA) Yes- + 1 No- 0   Pre-operative treatment with insulin Yes- + 1 No- 0   Pre-operative creatinine > 2mg/dl Yes- + 1 No- 0   Total:      Risk Status:  Estimated risk of cardiac complications after non-cardiac surgery using the Revised Cardiac Risk Index for Preoperative risk is 6.0 %      ARISCAT (Canet) risk index: Intermediate: 13.3% risk of post-op pulmonary complications.    American Society of Anesthesiologists Physical Status classification (ASA): 3           No further cardiac workup needed prior to surgery.    Outpatient Subjective & Objective

## 2022-03-14 NOTE — HPI
This is a 77 y.o. male  who presents today for a preoperative evaluation in preparation for a Urology  procedure.  Scheduled for  3/24/22  Surgery is indicated for left kidney - robotic nephrectomy.   Patient is new to me.  Details of current problem: The duration of problem Follow up on GIST 1 year ago- found Kidney mass and did f/u for evaluation in June 2021   Reports symptoms of  left back pain,   Aggravating factors include:   none  Relieving factors are    none  Treated with   testing and evaluation  Reports pain: 0/10  The history has been obtained by speaking with the patient and reviewing the electronic medical record and/or outside health information. Significant health conditions for the perioperative period are discussed below in assessment and plan.     Patient reports current health status to be Good. Denies any new symptoms before surgery.

## 2022-03-14 NOTE — PROGRESS NOTES
Dictation #1  MRN:[id number]  [facility]:[id number]  [facility]  Progress Note    Patient Name: Kurtis Plata  MRN: [id number]  Date of Evaluation- 03/15/2022  PCP- Anmol Joe MD    Future cases for Kurtis Plata [[id number]]     Case ID Status Date Time Oscar Procedure Provider Location    [id number] [facility] 3/24/2022  7:00  XI ROBOTIC NEPHRECTOMY/ RETROPERITONEAL Janes Ochoa MD [[id number]] N[facility] OR 2ND FLR          HPI:  This is a 77 y.o. male  who presents today for a preoperative evaluation in preparation for a Urology  procedure.  Scheduled for  3/24/22  Surgery is indicated for left kidney - robotic nephrectomy.   Patient is new to me.  Details of current problem: The duration of problem Follow up on GIST 1 year ago- found Kidney mass and did f/u for evaluation in June 2021   Reports symptoms of  left back pain,   Aggravating factors include:   none  Relieving factors are    none  Treated with   testing and evaluation  Reports pain: 0/10  The history has been obtained by speaking with the patient and reviewing the electronic medical record and/or outside health information. Significant health conditions for the perioperative period are discussed below in assessment and plan.     Patient reports current health status to be Good. Denies any new symptoms before surgery.         Subjective/ Objective:     Chief Complaint: Preoperative evaulation, perioperative medical management, and complication reduction plan.     Functional Capacity: Able to climb aflight of stairs without CP SOB or Syncope.  Able to meet 4 METS.  Walks, picks up grandchild,       Anesthesia issues: None    Difficulty mouth opening:none    Steroid use in the last 12 months:  none    Dental Issues: dentures    Family anesthesia difficulty: None     Family Hx of Thrombosis none    Past Medical History:   Diagnosis Date    Anemia     BPH (benign prostatic hyperplasia)     CKD (chronic kidney disease)     Colon polyps     Dialysis  patient     daily peritoneal    Dysphagia     Esophagitis     ESRD (end stage renal disease)     Gallbladder sludge     Gastritis     Gastrointestinal stromal tumor (GIST)     GERD (gastroesophageal reflux disease)     Gout     Hiatal hernia     HTN (hypertension)     Hyperlipidemia     Iron deficiency     Nephrosclerosis     Overweight     Proteinuria     Renal cysts, acquired, bilateral     Sleep apnea     Vitamin D deficiency          Past Surgical History:   Procedure Laterality Date    COLONOSCOPY W/ POLYPECTOMY  06/2017    ENDOSCOPIC ULTRASOUND OF UPPER GASTROINTESTINAL TRACT N/A 4/20/2021    Procedure: ULTRASOUND, UPPER GI TRACT, ENDOSCOPIC;  Surgeon: Anmol Joe MD;  Location: [facility] ([address] [place] [address]);  Service: Endoscopy;  Laterality: N/A;  Covid-19 test 4/17/21 at [place] - pg  Serum K+ before procedure (dialysis patient) - pg    ESOPHAGOGASTRODUODENOSCOPY  02/16/2021    PERITONEAL CATHETER INSERTION      RENAL BIOPSY  04/12/2017    VASECTOMY         Review of Systems   Constitutional: Positive for appetite change, fatigue and unexpected weight change. Negative for activity change, chills, diaphoresis and fever.   HENT: Negative for congestion, dental problem, drooling, trouble swallowing and voice change.         False teeth are little loose now   Eyes: Negative for photophobia and visual disturbance.        No acute visual changes   Respiratory: Negative for apnea, cough, choking, chest tightness, shortness of breath, wheezing and stridor.         Has ADEEL- wear CPAP   Cardiovascular: Positive for leg swelling. Negative for chest pain and palpitations.   Gastrointestinal: Positive for constipation. Negative for abdominal pain, blood in stool, diarrhea, nausea and vomiting.        No FLD, Hepatitis, Cirrhosis  No BRB or black tarry stool   Genitourinary: Negative for difficulty urinating, dysuria, frequency, hematuria and urgency.   Musculoskeletal: Negative for arthralgias, gait  "problem, myalgias, neck pain and neck stiffness.   Neurological: Positive for numbness. Negative for dizziness, seizures, syncope, light-headedness and headaches.   Psychiatric/Behavioral: Negative for suicidal ideas. The patient is not nervous/anxious.       VITALS  Visit Vitals  /61 (BP Location: Left arm, Patient Position: Sitting)   Pulse 78   Temp 99.4 °F (37.4 °C) (Oral)   Ht 5' 5" (1.651 m)   Wt 80.7 kg (178 lb)   SpO2 98%   BMI 29.62 kg/m²          Physical Exam  Constitutional:       General: He is not in acute distress.     Appearance: He is well-developed. He is not diaphoretic.   HENT:      Head: Normocephalic.      Right Ear: Decreased hearing noted.      Left Ear: Decreased hearing noted.      Nose: Nose normal.      Mouth/Throat:      Lips: Pink.      Comments: Full dentures  Eyes:      General: Lids are normal.      Conjunctiva/sclera: Conjunctivae normal.      Pupils: Pupils are equal, round, and reactive to light.   Neck:      Trachea: Trachea and phonation normal.   Cardiovascular:      Rate and Rhythm: Normal rate and regular rhythm.      Pulses:           Carotid pulses are 2+ on the right side and 2+ on the left side.       Radial pulses are 2+ on the right side and 2+ on the left side.        Posterior tibial pulses are 2+ on the right side and 2+ on the left side.      Heart sounds: Normal heart sounds. No murmur heard.    No friction rub. No gallop.      Comments: Trace le edema  Pulmonary:      Effort: Pulmonary effort is normal.      Breath sounds: Normal breath sounds.      Comments: Fine crackles to lung bases - cleared with  cough  Abdominal:      General: Bowel sounds are normal. There is no distension.      Palpations: Abdomen is soft.      Tenderness: There is no abdominal tenderness.       Musculoskeletal:         General: No tenderness or deformity. Normal range of motion.      Cervical back: Normal range of motion.      Right lower leg: No edema.      Left lower leg: No " edema.   Lymphadenopathy:      Head:      Right side of head: No submental, submandibular, tonsillar, preauricular, posterior auricular or occipital adenopathy.      Left side of head: No submental, submandibular, tonsillar, preauricular, posterior auricular or occipital adenopathy.      Cervical:      Right cervical: No superficial cervical adenopathy.     Left cervical: No superficial cervical adenopathy.   Skin:     General: Skin is warm and dry.      Capillary Refill: Capillary refill takes 2 to 3 seconds.      Coloration: Skin is not pale.      Findings: No erythema or rash.   Neurological:      Mental Status: He is alert and oriented to person, place, and time.      GCS: GCS eye subscore is 4. GCS verbal subscore is 5. GCS motor subscore is 6.      Motor: No abnormal muscle tone.      Coordination: Coordination normal.   Psychiatric:         Attention and Perception: Attention and perception normal.         Mood and Affect: Mood and affect normal.         Speech: Speech normal.         Behavior: Behavior normal. Behavior is cooperative.         Thought Content: Thought content normal.         Cognition and Memory: Cognition and memory normal.         Judgment: Judgment normal.          Significant Labs:  Lab Results   Component Value Date    WBC 7.84 03/14/2022    HGB 9.0 (L) 03/14/2022    HCT 30.8 (L) 03/14/2022     03/14/2022    ALT 14 03/14/2022    AST 16 03/14/2022     03/14/2022    K 3.4 (L) 03/14/2022     03/14/2022    CREATININE 5.8 (H) 03/14/2022    BUN 30 (H) 03/14/2022    CO2 30 (H) 03/14/2022       Diagnostic Studies: No relevant studies.    EKG:   Results for orders placed or performed during the hospital encounter of 03/14/22   EKG 12-lead    Collection Time: 03/14/22  1:26 PM    Narrative    Test Reason : Z01.818,    Vent. Rate : 076 BPM     Atrial Rate : 076 BPM     P-R Int : 142 ms          QRS Dur : 088 ms      QT Int : 406 ms       P-R-T Axes : 074 014 -71 degrees     QTc  Int : 456 ms    Normal sinus rhythm  Nonspecific ST and T wave abnormality  Abnormal ECG  No previous ECGs available  Confirmed by Carson Stanton MD ([id number]) on 3/14/2022 3:21:11 PM    Referred By: DENG PEÑA           Confirmed By:Carson Stanton MD       2D ECHO:  TTE:  No results found for this or any previous visit.    JOE:  No results found for this or any previous visit.     Imaging   · Result Note    · 1 Patient Communication    Details    Reading Physician Reading Date Result Priority   Chucho Juárez MD  [phone] 1/12/2022      Narrative & Impression  CMS MANDATED QUALITY DATA - CT RADIATION - 436     All CT scans at this facility utilize dose modulation, iterative reconstruction, and/or weight based dosing when appropriate to reduce radiation dose to as low as reasonably achievable.     Reason: renal mass, rcc suspected, rule out mets. RCC Suspected, recent Abd MRI, R/O mets to chest prior to surgery.     TECHNIQUE: CT thorax without IV contrast.     COMPARISON: MRI 12/17/2021     CT THORAX:  Linear opacities affect the medial left lower and, to a lesser extent, right lower lobe suggesting scarring or atelectasis. Lungs are otherwise clear. No pulmonary nodules or masses. Trachea and main bronchi are patent.     No pleural or pericardial effusion. No enlarged mediastinal or axillary lymph nodes. Coronary artery calcifications are diffuse.     Partially visualized upper abdomen shows hyperdense liver parenchyma and small volume of intra-abdominal ascites. Hypodensity in superior right kidney is incompletely visualized, correlating with a cyst on prior MRI.     IMPRESSION:     1. No evidence of metastatic disease throughout the chest.  2. Coronary artery calcifications.  3. Tiny volume ascites in the partially visualized upper abdomen.  4. Hyperdense liver parenchyma can be seen with passive congestion from right heart failure, hemochromatosis, or chronic amiodarone therapy.     Electronically signed by:   Cuhcho Juárez MD  1/12/2022 12:49 PM CST Workstation: [id number]             Specimen Collected: 01/12/22 11:43 Last Resulted: 01/12/22 12:49                Active Cardiac Conditions: None      Revised Cardiac Risk Index   High -Risk Surgery  Intraperitoneal; Intrathoracic; suprainguinal vascular Yes- + 1 No- 0   History of Ischemic Heart Disease   (Hx of MI/positive exercise test/current chest pain due to ischemia/use of nitrate therapy/EKG with pathological Q waves) Yes- + 1 No- 0   History of CHF  (Pulmonary edema/bilateral rales or S3 gallop/PND/CXR showing pulmonary vascular redistribution) Yes- + 1 No- 0   History of CVA   (Prior stroke or TIA) Yes- + 1 No- 0   Pre-operative treatment with insulin Yes- + 1 No- 0   Pre-operative creatinine > 2mg/dl Yes- + 1 No- 0   Total:      Risk Status:  Estimated risk of cardiac complications after non-cardiac surgery using the Revised Cardiac Risk Index for Preoperative risk is 6.0 %      ARISCAT (Canet) risk index: Intermediate: 13.3% risk of post-op pulmonary complications.    American Society of Anesthesiologists Physical Status classification (ASA): 3           No further cardiac workup needed prior to surgery.        Preoperative cardiac risk assessment-  The patient does not have any active cardiac conditions . Revised cardiac risk index predictors- ---.Functional capacity is more than 4 Mets. He will be undergoing a Urological procedure that carries a Moderate Risk risk     The estimated risk of the rate of adverse cardiac outcomes  6%    No further cardiac work up is indicated prior to proceeding with the surgery          American Society of Anesthesiologists Physical status classification ( ASA ) class: 3     Postoperative pulmonary complication risk assessment: 1.6%     ARISCAT ( Canet) risk index- risk class -  Low, if duration of surgery is under 3 hours, intermediate, if duration of surgery is over 3 hours          Assessment/Plan:     Primary  "hypertension  Current /70  today. Repeat 127/61   Taking: Norvasc 10 mg, Hydrodiuril 25mg, Losartan 100mg  Patient reports home BP readings of: 140/  Encouraged keeping a healthy weight and BMI  Lifestyle changes to reduce systolic BP:  exercise 30 minutes per day,  5 days per week or 150 minutes weekly; sodium reduction and avoidance of high salt foods such as processed meats, frozen meals and  fast foods.     BP acceptable for surgery. I recommend monitoring BP during perioperative period as well as uncontrolled pain which can elevated blood pressure.           Mixed hyperlipidemia  Atorvastatin     ADEEL (obstructive sleep apnea)  This client has a possible diagnosis of obstructive sleep apnea (ADEEL)    Instructions were given to avoid the following: sleeping supine, weight gain ,alcoholic beverages , sedative medications, and CNS depressant use as these can all worsen ADEEL.    Untreated sleep apnea has been shown to increase daytime sleepiness, hypertension, heart disease and stroke which were discussed with the patient at this time    Please use caution with medications that induce respiratory depression in the perioperative period      History of iron deficiency anemia  Hgb 9  HCT 30.8  Patient receives intermittent Iron infusions and EPO injection    Benign prostatic hyperplasia  Tamsulosin 0.4 mg    ESRD on peritoneal dialysis  Does PD nightly  HD center is [facility]  Nephrologist Shari Baltazar    GIST (gastrointestinal stromal tumor), malignant    Dr. Juarez/ NP [facility]  Watching tumor- think it is slow growing  " incidental finding of small stomach low grade GIST in 2/2021. He remains asymptomatic and is not interested in surgery at this time as he is concerned about interrupting PD. Repeat MRI abd for surveillance in [facility] 6 months".       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  Dehydration and surgical stress  can precipitate an acute gout flare-up        Hypokalemia  K 3.4  Patient admits skipping K supplement doses  Encouraged patient to resume K supplementation as ordered and he agreed    Hypoalbuminemia  Albumin 3  Patient reports decreased appetite  He has been eating one breakfast and combine lunch/dinner  Encourage patient to increase protein intake with meals and consider Nutritional supplemental drinks        Preventive perioperative care    Thromboembolic prophylaxis:  His risk factors for thrombosis include surgical procedure, age and reduced mobility.I suggest  thromboembolic prophylaxis ( mechanical/pharmacological, weighing the risk benefits of pharmacological agent use considering gusatvo procedural bleeding )  during the perioperative period.I suggested being active in the post operative period.      Postoperative pulmonary complication prophylaxis-Risk factors for post operative pulmonary complications include age over 65 years, surgery lasting over 3 hours and ASA class >2- I suggest incentive spirometry use, early ambulation and pain control so as to avoid diaphragmatic splinting  Brush teeth twice per day, oral rinses, sleep with the head of the bed up 30 degrees     Renal complication prophylaxis-Risk factors for renal complications include pre-existing renal disease, age and hypertension . I suggest keeping him well hydrated and avoidance/ minimizing the use of  NSAID's,DAILY 2 Inhibitors ,IV contrast if possible in the perioperative period.I suggested drinking 2 litre's of water a day      Surgical site Infection Prophylaxis-I  suggest appropriate antibiotic for Prophylaxis against Surgical site infections Shower with Dial Soap Antibacterial or  Hibiclens in the night before surgery and  the morning of surgery       In view of urological procedure the patient  is at risk of postoperative urinary retention.  I suggest avoidance / minimizing the of  Benzodiazepines,Anticholinergic medication,antihistamines ( Benadryl) , if possible in the perioperative period. I suggest using the minimum possible use of opioids for the minimum period of time in the perioperative period. Benadryl avoidance suggested      This visit was focused on Preoperative evaluation, Perioperative Medical management, complication reduction plans. I suggest that the patient follows up with primary care or relevant sub specialists for ongoing health care.    I appreciate the opportunity to be involved in this patients care. Please feel free to contact me if there were any questions about this consultation.    Patient is optimized      Oliverio Monet NP  Perioperative Medicine  [facility]   Pager [phone]

## 2022-03-14 NOTE — ASSESSMENT & PLAN NOTE
Does PD nightly  HD center is [facility] in [place]  Nephrologist Shari Baltazar

## 2022-03-15 DIAGNOSIS — N18.6 ESRD ON PERITONEAL DIALYSIS: Primary | ICD-10-CM

## 2022-03-15 DIAGNOSIS — Z99.2 ESRD ON PERITONEAL DIALYSIS: Primary | ICD-10-CM

## 2022-03-15 PROBLEM — E88.09 HYPOALBUMINEMIA: Status: ACTIVE | Noted: 2022-03-15

## 2022-03-15 PROBLEM — E87.6 HYPOKALEMIA: Status: ACTIVE | Noted: 2022-03-15

## 2022-03-15 NOTE — PRE-PROCEDURE INSTRUCTIONS
PreOp Instructions given:    -- Medication information (what to hold and what to take)   -- NPO guidelines as follows: (or as per your Surgeon)  1. Stop ALL solid food, gum, candy (including vitamins) 8 hours before surgery/procedure time.  2. Stop all CLOUDY liquids: coffee with creamer, cloudy juices, 6 hours prior to surgery/procedure  time.  3. The patient should be ENCOURAGED to drink carbohydrate-rich clear liquids (sports drinks, clear juices) until 2 hours prior to surgery/procedure  time.  4. CLEAR liquids include only water, black coffee NO creamer, clear oral rehydration drinks, clear sports drinks or clear fruit juices (no orange juice, no pulpy juices, no apple cider).   5. IF IN DOUBT, drink water instead.   6. NOTHING TO DRINK 2 hours before to surgery/procedure  time. If you are told to take medication on the morning of surgery, it may be taken with a sip of water.   -- Arrival place and directions given; time to be given the day before procedure by the Surgeon's Office   -- Bathing with antibacterial soap   -- Don't wear any jewelry or bring any valuables AM of surgery   -- No makeup or moisturizer to face   -- No perfume/cologne, powder, lotions or aftershave     Pt and daughter verbalized understanding.

## 2022-03-15 NOTE — ASSESSMENT & PLAN NOTE
This patient has a history of elevated uric acid levels  Please consider continuing the current maintenance medication for gout and keep the patient adequately hydrated.  Dehydration and surgical stress  can precipitate an acute gout flare-up

## 2022-03-15 NOTE — ASSESSMENT & PLAN NOTE
Albumin 3  Patient reports decreased appetite  He has been eating one breakfast and combine lunch/dinner  Encourage patient to increase protein intake with meals and consider Nutritional supplemental drinks

## 2022-03-15 NOTE — ASSESSMENT & PLAN NOTE
K 3.4  Patient admits skipping K supplement doses  Encouraged patient to resume K supplementation as ordered and he agreed

## 2022-03-16 ENCOUNTER — ANESTHESIA EVENT (OUTPATIENT)
Dept: SURGERY | Facility: HOSPITAL | Age: 77
DRG: 656 | End: 2022-03-16
Payer: MEDICARE

## 2022-03-23 ENCOUNTER — TELEPHONE (OUTPATIENT)
Dept: UROLOGY | Facility: CLINIC | Age: 77
End: 2022-03-23
Payer: MEDICARE

## 2022-03-23 NOTE — TELEPHONE ENCOUNTER
Called pt to confirm arrival time of 5am for procedure on 3/24/2022. Gave pt NPO instructions and gave pt opportunity to ask questions. Pt verbalized understanding.     Pt was informed that only 1 person would be allowed to accompany them the morning of surgery.  Pt verbalized understanding.

## 2022-03-23 NOTE — ANESTHESIA PREPROCEDURE EVALUATION
03/23/2022  Kurtis Plata is a 77 y.o., male.    Patient Active Problem List    Diagnosis Date Noted    Hypokalemia 03/15/2022    Hypoalbuminemia 03/15/2022    Vitamin D deficiency 03/14/2022    Vitamin B 12 deficiency 03/14/2022    Hypomagnesemia 03/14/2022    ADEEL (obstructive sleep apnea) 03/13/2022    Primary hypertension 03/13/2022    History of iron deficiency anemia 03/13/2022    Benign prostatic hyperplasia 03/13/2022    Renal mass, left 12/20/2021    GIST (gastrointestinal stromal tumor), malignant 04/20/2021    ESRD on peritoneal dialysis 04/20/2021    Renovascular hypertension 04/20/2021    Mixed hyperlipidemia 04/20/2021    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     I have reviewed the Nursing Notes.    I have reviewed the Medications.     Review of Systems  Anesthesia Hx:  No problems with previous Anesthesia   Denies Personal Hx of Anesthesia complications.   Social:  Non-Smoker, No Alcohol Use    Hematology/Oncology:        Current/Recent Cancer. Other (see Oncology comments) Oncology Comments: GIST   Cardiovascular:   Exercise tolerance: good Hypertension CAD asymptomatic  hyperlipidemia ECG has been reviewed.    Pulmonary:   Sleep Apnea    Renal/:   Chronic Renal Disease, ESRD, Dialysis    Hepatic/GI:   Hiatal Hernia, GERD, well controlled        Physical Exam  General: Well nourished, Cooperative and Alert    Airway:  Mallampati: III   Mouth Opening: Normal  TM Distance: Normal  Tongue: Normal  Neck ROM: Normal ROM    Dental:  Dentures    Chest/Lungs:  Normal Respiratory Rate        Anesthesia Plan  Type of Anesthesia, risks & benefits discussed:    Anesthesia Type: Gen ETT  Intra-op Monitoring Plan: Standard ASA Monitors and Central Line  Post Op Pain Control Plan: multimodal  analgesia and IV/PO Opioids PRN  Induction:  IV  Airway Plan: Direct, Post-Induction  ASA Score: 3    Ready For Surgery From Anesthesia Perspective.     .

## 2022-03-24 ENCOUNTER — HOSPITAL ENCOUNTER (INPATIENT)
Facility: HOSPITAL | Age: 77
LOS: 7 days | Discharge: HOME HEALTH | DRG: 656 | End: 2022-03-31
Attending: UROLOGY | Admitting: UROLOGY
Payer: MEDICARE

## 2022-03-24 ENCOUNTER — RESEARCH ENCOUNTER (OUTPATIENT)
Dept: RESEARCH | Facility: HOSPITAL | Age: 77
End: 2022-03-24
Payer: MEDICARE

## 2022-03-24 ENCOUNTER — ANESTHESIA (OUTPATIENT)
Dept: SURGERY | Facility: HOSPITAL | Age: 77
DRG: 656 | End: 2022-03-24
Payer: MEDICARE

## 2022-03-24 DIAGNOSIS — Z99.2 ESRD ON PERITONEAL DIALYSIS: ICD-10-CM

## 2022-03-24 DIAGNOSIS — Z01.818 PREOPERATIVE TESTING: ICD-10-CM

## 2022-03-24 DIAGNOSIS — N18.6 ESRD ON PERITONEAL DIALYSIS: ICD-10-CM

## 2022-03-24 DIAGNOSIS — E87.5 HYPERKALEMIA: ICD-10-CM

## 2022-03-24 DIAGNOSIS — N28.89 RENAL MASS: Primary | ICD-10-CM

## 2022-03-24 DIAGNOSIS — N28.89 RENAL MASS, LEFT: ICD-10-CM

## 2022-03-24 DIAGNOSIS — R00.0 TACHYCARDIA: ICD-10-CM

## 2022-03-24 LAB
ABO + RH BLD: NORMAL
ANION GAP SERPL CALC-SCNC: 9 MMOL/L (ref 8–16)
BASOPHILS # BLD AUTO: 0.01 K/UL (ref 0–0.2)
BASOPHILS # BLD AUTO: 0.02 K/UL (ref 0–0.2)
BASOPHILS NFR BLD: 0.1 % (ref 0–1.9)
BASOPHILS NFR BLD: 0.2 % (ref 0–1.9)
BLD GP AB SCN CELLS X3 SERPL QL: NORMAL
BUN SERPL-MCNC: 46 MG/DL (ref 8–23)
CALCIUM SERPL-MCNC: 8.2 MG/DL (ref 8.7–10.5)
CHLORIDE SERPL-SCNC: 110 MMOL/L (ref 95–110)
CO2 SERPL-SCNC: 21 MMOL/L (ref 23–29)
CREAT SERPL-MCNC: 7.9 MG/DL (ref 0.5–1.4)
DIFFERENTIAL METHOD: ABNORMAL
DIFFERENTIAL METHOD: ABNORMAL
EOSINOPHIL # BLD AUTO: 0 K/UL (ref 0–0.5)
EOSINOPHIL # BLD AUTO: 0 K/UL (ref 0–0.5)
EOSINOPHIL NFR BLD: 0 % (ref 0–8)
EOSINOPHIL NFR BLD: 0 % (ref 0–8)
ERYTHROCYTE [DISTWIDTH] IN BLOOD BY AUTOMATED COUNT: 16.5 % (ref 11.5–14.5)
ERYTHROCYTE [DISTWIDTH] IN BLOOD BY AUTOMATED COUNT: 16.6 % (ref 11.5–14.5)
EST. GFR  (AFRICAN AMERICAN): 6.8 ML/MIN/1.73 M^2
EST. GFR  (NON AFRICAN AMERICAN): 5.9 ML/MIN/1.73 M^2
GLUCOSE SERPL-MCNC: 163 MG/DL (ref 70–110)
HCT VFR BLD AUTO: 22.4 % (ref 40–54)
HCT VFR BLD AUTO: 23.3 % (ref 40–54)
HGB BLD-MCNC: 6.7 G/DL (ref 14–18)
HGB BLD-MCNC: 7.2 G/DL (ref 14–18)
IMM GRANULOCYTES # BLD AUTO: 0.03 K/UL (ref 0–0.04)
IMM GRANULOCYTES # BLD AUTO: 0.06 K/UL (ref 0–0.04)
IMM GRANULOCYTES NFR BLD AUTO: 0.2 % (ref 0–0.5)
IMM GRANULOCYTES NFR BLD AUTO: 0.5 % (ref 0–0.5)
LYMPHOCYTES # BLD AUTO: 0.5 K/UL (ref 1–4.8)
LYMPHOCYTES # BLD AUTO: 0.5 K/UL (ref 1–4.8)
LYMPHOCYTES NFR BLD: 3.6 % (ref 18–48)
LYMPHOCYTES NFR BLD: 4.2 % (ref 18–48)
MAGNESIUM SERPL-MCNC: 1.6 MG/DL (ref 1.6–2.6)
MCH RBC QN AUTO: 27.9 PG (ref 27–31)
MCH RBC QN AUTO: 28.2 PG (ref 27–31)
MCHC RBC AUTO-ENTMCNC: 29.9 G/DL (ref 32–36)
MCHC RBC AUTO-ENTMCNC: 30.9 G/DL (ref 32–36)
MCV RBC AUTO: 91 FL (ref 82–98)
MCV RBC AUTO: 93 FL (ref 82–98)
MONOCYTES # BLD AUTO: 0.3 K/UL (ref 0.3–1)
MONOCYTES # BLD AUTO: 0.7 K/UL (ref 0.3–1)
MONOCYTES NFR BLD: 2.5 % (ref 4–15)
MONOCYTES NFR BLD: 4.9 % (ref 4–15)
NEUTROPHILS # BLD AUTO: 12 K/UL (ref 1.8–7.7)
NEUTROPHILS # BLD AUTO: 12.1 K/UL (ref 1.8–7.7)
NEUTROPHILS NFR BLD: 90.9 % (ref 38–73)
NEUTROPHILS NFR BLD: 92.9 % (ref 38–73)
NRBC BLD-RTO: 0 /100 WBC
NRBC BLD-RTO: 0 /100 WBC
PHOSPHATE SERPL-MCNC: 3.5 MG/DL (ref 2.7–4.5)
PLATELET # BLD AUTO: 197 K/UL (ref 150–450)
PLATELET # BLD AUTO: 210 K/UL (ref 150–450)
PMV BLD AUTO: 10.7 FL (ref 9.2–12.9)
PMV BLD AUTO: 10.8 FL (ref 9.2–12.9)
POTASSIUM SERPL-SCNC: 5.3 MMOL/L (ref 3.5–5.1)
RBC # BLD AUTO: 2.4 M/UL (ref 4.6–6.2)
RBC # BLD AUTO: 2.55 M/UL (ref 4.6–6.2)
SODIUM SERPL-SCNC: 140 MMOL/L (ref 136–145)
WBC # BLD AUTO: 12.98 K/UL (ref 3.9–12.7)
WBC # BLD AUTO: 13.15 K/UL (ref 3.9–12.7)

## 2022-03-24 PROCEDURE — 64999 UNLISTED PX NERVOUS SYSTEM: CPT | Mod: ,,, | Performed by: ANESTHESIOLOGY

## 2022-03-24 PROCEDURE — 88307 TISSUE EXAM BY PATHOLOGIST: CPT | Mod: 26,,, | Performed by: PATHOLOGY

## 2022-03-24 PROCEDURE — 50545 LAPARO RADICAL NEPHRECTOMY: CPT | Mod: LT,,, | Performed by: UROLOGY

## 2022-03-24 PROCEDURE — 64999 L ESP SS: ICD-10-PCS | Mod: ,,, | Performed by: ANESTHESIOLOGY

## 2022-03-24 PROCEDURE — 99223 PR INITIAL HOSPITAL CARE,LEVL III: ICD-10-PCS | Mod: ,,, | Performed by: NURSE PRACTITIONER

## 2022-03-24 PROCEDURE — 36556 INSERT NON-TUNNEL CV CATH: CPT | Mod: 59,,, | Performed by: ANESTHESIOLOGY

## 2022-03-24 PROCEDURE — 25000003 PHARM REV CODE 250: Performed by: STUDENT IN AN ORGANIZED HEALTH CARE EDUCATION/TRAINING PROGRAM

## 2022-03-24 PROCEDURE — 37000008 HC ANESTHESIA 1ST 15 MINUTES: Performed by: UROLOGY

## 2022-03-24 PROCEDURE — D9220A PRA ANESTHESIA: ICD-10-PCS | Mod: ,,, | Performed by: ANESTHESIOLOGY

## 2022-03-24 PROCEDURE — 63600175 PHARM REV CODE 636 W HCPCS: Performed by: ANESTHESIOLOGY

## 2022-03-24 PROCEDURE — 63600175 PHARM REV CODE 636 W HCPCS: Performed by: STUDENT IN AN ORGANIZED HEALTH CARE EDUCATION/TRAINING PROGRAM

## 2022-03-24 PROCEDURE — 76942 ECHO GUIDE FOR BIOPSY: CPT | Mod: 26,,, | Performed by: ANESTHESIOLOGY

## 2022-03-24 PROCEDURE — D9220A PRA ANESTHESIA: Mod: ,,, | Performed by: ANESTHESIOLOGY

## 2022-03-24 PROCEDURE — 27201423 OPTIME MED/SURG SUP & DEVICES STERILE SUPPLY: Performed by: UROLOGY

## 2022-03-24 PROCEDURE — 83735 ASSAY OF MAGNESIUM: CPT | Performed by: STUDENT IN AN ORGANIZED HEALTH CARE EDUCATION/TRAINING PROGRAM

## 2022-03-24 PROCEDURE — 86900 BLOOD TYPING SEROLOGIC ABO: CPT | Performed by: STUDENT IN AN ORGANIZED HEALTH CARE EDUCATION/TRAINING PROGRAM

## 2022-03-24 PROCEDURE — 25000003 PHARM REV CODE 250: Performed by: ANESTHESIOLOGY

## 2022-03-24 PROCEDURE — C1727 CATH, BAL TIS DIS, NON-VAS: HCPCS | Performed by: UROLOGY

## 2022-03-24 PROCEDURE — 36556: ICD-10-PCS | Mod: 59,,, | Performed by: ANESTHESIOLOGY

## 2022-03-24 PROCEDURE — 27200704 HC ULTRASOUND NDL/ECHOSTIM: Performed by: ANESTHESIOLOGY

## 2022-03-24 PROCEDURE — 80048 BASIC METABOLIC PNL TOTAL CA: CPT | Performed by: STUDENT IN AN ORGANIZED HEALTH CARE EDUCATION/TRAINING PROGRAM

## 2022-03-24 PROCEDURE — 85025 COMPLETE CBC W/AUTO DIFF WBC: CPT | Mod: 91 | Performed by: STUDENT IN AN ORGANIZED HEALTH CARE EDUCATION/TRAINING PROGRAM

## 2022-03-24 PROCEDURE — 63600175 PHARM REV CODE 636 W HCPCS: Performed by: SURGERY

## 2022-03-24 PROCEDURE — 37000009 HC ANESTHESIA EA ADD 15 MINS: Performed by: UROLOGY

## 2022-03-24 PROCEDURE — 88341 IMHCHEM/IMCYTCHM EA ADD ANTB: CPT | Mod: 26,,, | Performed by: PATHOLOGY

## 2022-03-24 PROCEDURE — 11000001 HC ACUTE MED/SURG PRIVATE ROOM

## 2022-03-24 PROCEDURE — 88307 TISSUE EXAM BY PATHOLOGIST: CPT | Performed by: PATHOLOGY

## 2022-03-24 PROCEDURE — 36000712 HC OR TIME LEV V 1ST 15 MIN: Performed by: UROLOGY

## 2022-03-24 PROCEDURE — 86901 BLOOD TYPING SEROLOGIC RH(D): CPT | Performed by: STUDENT IN AN ORGANIZED HEALTH CARE EDUCATION/TRAINING PROGRAM

## 2022-03-24 PROCEDURE — 99223 1ST HOSP IP/OBS HIGH 75: CPT | Mod: ,,, | Performed by: NURSE PRACTITIONER

## 2022-03-24 PROCEDURE — 76942 PR U/S GUIDANCE FOR NEEDLE GUIDANCE: ICD-10-PCS | Mod: 26,,, | Performed by: ANESTHESIOLOGY

## 2022-03-24 PROCEDURE — 25000003 PHARM REV CODE 250: Performed by: NURSE PRACTITIONER

## 2022-03-24 PROCEDURE — 90935 HEMODIALYSIS ONE EVALUATION: CPT

## 2022-03-24 PROCEDURE — 71000015 HC POSTOP RECOV 1ST HR: Performed by: UROLOGY

## 2022-03-24 PROCEDURE — 76942 ECHO GUIDE FOR BIOPSY: CPT | Performed by: ANESTHESIOLOGY

## 2022-03-24 PROCEDURE — 88307 PR  SURG PATH,LEVEL V: ICD-10-PCS | Mod: 26,,, | Performed by: PATHOLOGY

## 2022-03-24 PROCEDURE — 88341 IMHCHEM/IMCYTCHM EA ADD ANTB: CPT | Performed by: PATHOLOGY

## 2022-03-24 PROCEDURE — 86920 COMPATIBILITY TEST SPIN: CPT | Performed by: STUDENT IN AN ORGANIZED HEALTH CARE EDUCATION/TRAINING PROGRAM

## 2022-03-24 PROCEDURE — 88341 PR IHC OR ICC EACH ADD'L SINGLE ANTIBODY  STAINPR: ICD-10-PCS | Mod: 26,,, | Performed by: PATHOLOGY

## 2022-03-24 PROCEDURE — 71000033 HC RECOVERY, INTIAL HOUR: Performed by: UROLOGY

## 2022-03-24 PROCEDURE — 88342 CHG IMMUNOCYTOCHEMISTRY: ICD-10-PCS | Mod: 26,,, | Performed by: PATHOLOGY

## 2022-03-24 PROCEDURE — 84100 ASSAY OF PHOSPHORUS: CPT | Performed by: STUDENT IN AN ORGANIZED HEALTH CARE EDUCATION/TRAINING PROGRAM

## 2022-03-24 PROCEDURE — 50545 PR LAP, RADICAL NEPHRECTOMY: ICD-10-PCS | Mod: LT,,, | Performed by: UROLOGY

## 2022-03-24 PROCEDURE — 71000039 HC RECOVERY, EACH ADD'L HOUR: Performed by: UROLOGY

## 2022-03-24 PROCEDURE — 88342 IMHCHEM/IMCYTCHM 1ST ANTB: CPT | Mod: 26,,, | Performed by: PATHOLOGY

## 2022-03-24 PROCEDURE — 36000713 HC OR TIME LEV V EA ADD 15 MIN: Performed by: UROLOGY

## 2022-03-24 PROCEDURE — 88342 IMHCHEM/IMCYTCHM 1ST ANTB: CPT | Performed by: PATHOLOGY

## 2022-03-24 RX ORDER — HEPARIN SODIUM 5000 [USP'U]/ML
5000 INJECTION, SOLUTION INTRAVENOUS; SUBCUTANEOUS ONCE
Status: DISCONTINUED | OUTPATIENT
Start: 2022-03-24 | End: 2022-03-24

## 2022-03-24 RX ORDER — DEXAMETHASONE SODIUM PHOSPHATE 4 MG/ML
INJECTION, SOLUTION INTRA-ARTICULAR; INTRALESIONAL; INTRAMUSCULAR; INTRAVENOUS; SOFT TISSUE
Status: DISCONTINUED | OUTPATIENT
Start: 2022-03-24 | End: 2022-03-24

## 2022-03-24 RX ORDER — EPHEDRINE SULFATE 50 MG/ML
INJECTION, SOLUTION INTRAVENOUS
Status: DISCONTINUED | OUTPATIENT
Start: 2022-03-24 | End: 2022-03-24

## 2022-03-24 RX ORDER — OXYCODONE HYDROCHLORIDE 5 MG/1
5 TABLET ORAL
Status: DISCONTINUED | OUTPATIENT
Start: 2022-03-24 | End: 2022-03-24 | Stop reason: HOSPADM

## 2022-03-24 RX ORDER — AMLODIPINE BESYLATE 10 MG/1
10 TABLET ORAL DAILY
Status: DISCONTINUED | OUTPATIENT
Start: 2022-03-25 | End: 2022-03-31 | Stop reason: HOSPADM

## 2022-03-24 RX ORDER — OXYCODONE HYDROCHLORIDE 5 MG/1
5 TABLET ORAL EVERY 4 HOURS PRN
Status: DISCONTINUED | OUTPATIENT
Start: 2022-03-24 | End: 2022-03-31 | Stop reason: HOSPADM

## 2022-03-24 RX ORDER — ONDANSETRON 2 MG/ML
INJECTION INTRAMUSCULAR; INTRAVENOUS
Status: DISCONTINUED | OUTPATIENT
Start: 2022-03-24 | End: 2022-03-24

## 2022-03-24 RX ORDER — HYDROCHLOROTHIAZIDE 25 MG/1
25 TABLET ORAL DAILY
Status: DISCONTINUED | OUTPATIENT
Start: 2022-03-25 | End: 2022-03-31 | Stop reason: HOSPADM

## 2022-03-24 RX ORDER — MIDAZOLAM HYDROCHLORIDE 1 MG/ML
.5-4 INJECTION INTRAMUSCULAR; INTRAVENOUS
Status: DISCONTINUED | OUTPATIENT
Start: 2022-03-24 | End: 2022-03-24

## 2022-03-24 RX ORDER — NEOSTIGMINE METHYLSULFATE 0.5 MG/ML
INJECTION, SOLUTION INTRAVENOUS
Status: DISCONTINUED | OUTPATIENT
Start: 2022-03-24 | End: 2022-03-24

## 2022-03-24 RX ORDER — HEPARIN SODIUM 5000 [USP'U]/ML
5000 INJECTION, SOLUTION INTRAVENOUS; SUBCUTANEOUS EVERY 8 HOURS
Status: DISCONTINUED | OUTPATIENT
Start: 2022-03-24 | End: 2022-03-31 | Stop reason: HOSPADM

## 2022-03-24 RX ORDER — METOCLOPRAMIDE 5 MG/1
5 TABLET ORAL NIGHTLY
Status: DISCONTINUED | OUTPATIENT
Start: 2022-03-24 | End: 2022-03-31 | Stop reason: HOSPADM

## 2022-03-24 RX ORDER — FENTANYL CITRATE 50 UG/ML
25-200 INJECTION, SOLUTION INTRAMUSCULAR; INTRAVENOUS
Status: DISCONTINUED | OUTPATIENT
Start: 2022-03-24 | End: 2022-03-24

## 2022-03-24 RX ORDER — CINACALCET 30 MG/1
30 TABLET, FILM COATED ORAL
Status: DISCONTINUED | OUTPATIENT
Start: 2022-03-25 | End: 2022-03-31 | Stop reason: HOSPADM

## 2022-03-24 RX ORDER — FENTANYL CITRATE 50 UG/ML
INJECTION, SOLUTION INTRAMUSCULAR; INTRAVENOUS
Status: DISCONTINUED | OUTPATIENT
Start: 2022-03-24 | End: 2022-03-24

## 2022-03-24 RX ORDER — SODIUM CHLORIDE 9 MG/ML
INJECTION, SOLUTION INTRAVENOUS ONCE
Status: COMPLETED | OUTPATIENT
Start: 2022-03-24 | End: 2022-03-24

## 2022-03-24 RX ORDER — CYANOCOBALAMIN (VITAMIN B-12) 250 MCG
500 TABLET ORAL 2 TIMES DAILY
Status: DISCONTINUED | OUTPATIENT
Start: 2022-03-24 | End: 2022-03-31 | Stop reason: HOSPADM

## 2022-03-24 RX ORDER — DIPHENHYDRAMINE HCL 25 MG
25 CAPSULE ORAL EVERY 12 HOURS PRN
Status: DISCONTINUED | OUTPATIENT
Start: 2022-03-24 | End: 2022-03-31 | Stop reason: HOSPADM

## 2022-03-24 RX ORDER — PROPOFOL 10 MG/ML
VIAL (ML) INTRAVENOUS
Status: DISCONTINUED | OUTPATIENT
Start: 2022-03-24 | End: 2022-03-24

## 2022-03-24 RX ORDER — PHENYLEPHRINE HCL IN 0.9% NACL 1 MG/10 ML
SYRINGE (ML) INTRAVENOUS
Status: DISCONTINUED | OUTPATIENT
Start: 2022-03-24 | End: 2022-03-24

## 2022-03-24 RX ORDER — ATORVASTATIN CALCIUM 20 MG/1
20 TABLET, FILM COATED ORAL NIGHTLY
Status: DISCONTINUED | OUTPATIENT
Start: 2022-03-24 | End: 2022-03-31 | Stop reason: HOSPADM

## 2022-03-24 RX ORDER — ONDANSETRON 2 MG/ML
4 INJECTION INTRAMUSCULAR; INTRAVENOUS EVERY 6 HOURS PRN
Status: DISCONTINUED | OUTPATIENT
Start: 2022-03-24 | End: 2022-03-31 | Stop reason: HOSPADM

## 2022-03-24 RX ORDER — SODIUM CHLORIDE 9 MG/ML
INJECTION, SOLUTION INTRAVENOUS CONTINUOUS
Status: DISCONTINUED | OUTPATIENT
Start: 2022-03-24 | End: 2022-03-24

## 2022-03-24 RX ORDER — LOSARTAN POTASSIUM 50 MG/1
50 TABLET ORAL DAILY
Status: DISCONTINUED | OUTPATIENT
Start: 2022-03-25 | End: 2022-03-31 | Stop reason: HOSPADM

## 2022-03-24 RX ORDER — METHOCARBAMOL 500 MG/1
1000 TABLET, FILM COATED ORAL EVERY 6 HOURS PRN
Status: DISCONTINUED | OUTPATIENT
Start: 2022-03-24 | End: 2022-03-31 | Stop reason: HOSPADM

## 2022-03-24 RX ORDER — CEFAZOLIN SODIUM/WATER 2 G/20 ML
2 SYRINGE (ML) INTRAVENOUS
Status: COMPLETED | OUTPATIENT
Start: 2022-03-24 | End: 2022-03-24

## 2022-03-24 RX ORDER — ACETAMINOPHEN 500 MG
1000 TABLET ORAL ONCE
Status: COMPLETED | OUTPATIENT
Start: 2022-03-24 | End: 2022-03-24

## 2022-03-24 RX ORDER — LIDOCAINE HYDROCHLORIDE 20 MG/ML
INJECTION, SOLUTION EPIDURAL; INFILTRATION; INTRACAUDAL; PERINEURAL
Status: DISCONTINUED | OUTPATIENT
Start: 2022-03-24 | End: 2022-03-24

## 2022-03-24 RX ORDER — DOXAZOSIN 8 MG/1
8 TABLET ORAL NIGHTLY
Status: DISCONTINUED | OUTPATIENT
Start: 2022-03-24 | End: 2022-03-31 | Stop reason: HOSPADM

## 2022-03-24 RX ORDER — TALC
6 POWDER (GRAM) TOPICAL NIGHTLY PRN
Status: DISCONTINUED | OUTPATIENT
Start: 2022-03-24 | End: 2022-03-31 | Stop reason: HOSPADM

## 2022-03-24 RX ORDER — HYDROMORPHONE HYDROCHLORIDE 1 MG/ML
0.5 INJECTION, SOLUTION INTRAMUSCULAR; INTRAVENOUS; SUBCUTANEOUS EVERY 10 MIN PRN
Status: DISCONTINUED | OUTPATIENT
Start: 2022-03-24 | End: 2022-03-24 | Stop reason: HOSPADM

## 2022-03-24 RX ORDER — CISATRACURIUM BESYLATE 2 MG/ML
INJECTION, SOLUTION INTRAVENOUS
Status: DISCONTINUED | OUTPATIENT
Start: 2022-03-24 | End: 2022-03-24

## 2022-03-24 RX ORDER — PROCHLORPERAZINE EDISYLATE 5 MG/ML
5 INJECTION INTRAMUSCULAR; INTRAVENOUS EVERY 30 MIN PRN
Status: DISCONTINUED | OUTPATIENT
Start: 2022-03-24 | End: 2022-03-24 | Stop reason: HOSPADM

## 2022-03-24 RX ORDER — OXYCODONE HYDROCHLORIDE 10 MG/1
10 TABLET ORAL EVERY 4 HOURS PRN
Status: DISCONTINUED | OUTPATIENT
Start: 2022-03-24 | End: 2022-03-31 | Stop reason: HOSPADM

## 2022-03-24 RX ORDER — HYDROCODONE BITARTRATE AND ACETAMINOPHEN 500; 5 MG/1; MG/1
TABLET ORAL
Status: DISCONTINUED | OUTPATIENT
Start: 2022-03-24 | End: 2022-03-31 | Stop reason: HOSPADM

## 2022-03-24 RX ORDER — BUPIVACAINE HYDROCHLORIDE 7.5 MG/ML
INJECTION, SOLUTION EPIDURAL; RETROBULBAR
Status: COMPLETED | OUTPATIENT
Start: 2022-03-24 | End: 2022-03-24

## 2022-03-24 RX ORDER — ACETAMINOPHEN 500 MG
1000 TABLET ORAL EVERY 6 HOURS
Status: DISPENSED | OUTPATIENT
Start: 2022-03-24 | End: 2022-03-26

## 2022-03-24 RX ORDER — FENTANYL CITRATE 50 UG/ML
25 INJECTION, SOLUTION INTRAMUSCULAR; INTRAVENOUS EVERY 5 MIN PRN
Status: DISCONTINUED | OUTPATIENT
Start: 2022-03-24 | End: 2022-03-24 | Stop reason: HOSPADM

## 2022-03-24 RX ORDER — ONDANSETRON 2 MG/ML
4 INJECTION INTRAMUSCULAR; INTRAVENOUS DAILY PRN
Status: DISCONTINUED | OUTPATIENT
Start: 2022-03-24 | End: 2022-03-24 | Stop reason: HOSPADM

## 2022-03-24 RX ORDER — SODIUM CHLORIDE 0.9 % (FLUSH) 0.9 %
10 SYRINGE (ML) INJECTION
Status: DISCONTINUED | OUTPATIENT
Start: 2022-03-24 | End: 2022-03-31 | Stop reason: HOSPADM

## 2022-03-24 RX ADMIN — EPHEDRINE SULFATE 10 MG: 50 INJECTION INTRAVENOUS at 09:03

## 2022-03-24 RX ADMIN — Medication 100 MCG: at 08:03

## 2022-03-24 RX ADMIN — DEXAMETHASONE SODIUM PHOSPHATE 8 MG: 4 INJECTION INTRA-ARTICULAR; INTRALESIONAL; INTRAMUSCULAR; INTRAVENOUS; SOFT TISSUE at 08:03

## 2022-03-24 RX ADMIN — MIDAZOLAM 2 MG: 1 INJECTION INTRAMUSCULAR; INTRAVENOUS at 07:03

## 2022-03-24 RX ADMIN — CISATRACURIUM BESYLATE 4 MG: 2 INJECTION INTRAVENOUS at 08:03

## 2022-03-24 RX ADMIN — SODIUM CHLORIDE: 0.9 INJECTION, SOLUTION INTRAVENOUS at 10:03

## 2022-03-24 RX ADMIN — OXYCODONE HYDROCHLORIDE 10 MG: 10 TABLET ORAL at 06:03

## 2022-03-24 RX ADMIN — CISATRACURIUM BESYLATE 2 MG: 2 INJECTION INTRAVENOUS at 11:03

## 2022-03-24 RX ADMIN — Medication 100 MCG: at 10:03

## 2022-03-24 RX ADMIN — OXYCODONE HYDROCHLORIDE 10 MG: 10 TABLET ORAL at 02:03

## 2022-03-24 RX ADMIN — CYANOCOBALAMIN TAB 250 MCG 500 MCG: 250 TAB at 09:03

## 2022-03-24 RX ADMIN — ATORVASTATIN CALCIUM 20 MG: 20 TABLET, FILM COATED ORAL at 09:03

## 2022-03-24 RX ADMIN — ACETAMINOPHEN 1000 MG: 500 TABLET ORAL at 11:03

## 2022-03-24 RX ADMIN — ACETAMINOPHEN 1000 MG: 500 TABLET ORAL at 06:03

## 2022-03-24 RX ADMIN — SODIUM CHLORIDE: 0.9 INJECTION, SOLUTION INTRAVENOUS at 03:03

## 2022-03-24 RX ADMIN — BUPIVACAINE HYDROCHLORIDE 15 ML: 7.5 INJECTION, SOLUTION EPIDURAL; RETROBULBAR at 07:03

## 2022-03-24 RX ADMIN — NEOSTIGMINE METHYLSULFATE 5 MG: 0.5 INJECTION INTRAVENOUS at 12:03

## 2022-03-24 RX ADMIN — Medication 100 MCG: at 09:03

## 2022-03-24 RX ADMIN — PROPOFOL 100 MG: 10 INJECTION, EMULSION INTRAVENOUS at 07:03

## 2022-03-24 RX ADMIN — CISATRACURIUM BESYLATE 4 MG: 2 INJECTION INTRAVENOUS at 10:03

## 2022-03-24 RX ADMIN — METOCLOPRAMIDE 5 MG: 5 TABLET ORAL at 09:03

## 2022-03-24 RX ADMIN — Medication 100 MCG: at 11:03

## 2022-03-24 RX ADMIN — EPHEDRINE SULFATE 10 MG: 50 INJECTION INTRAVENOUS at 11:03

## 2022-03-24 RX ADMIN — CISATRACURIUM BESYLATE 4 MG: 2 INJECTION INTRAVENOUS at 09:03

## 2022-03-24 RX ADMIN — FENTANYL CITRATE 50 MCG: 50 INJECTION INTRAMUSCULAR; INTRAVENOUS at 08:03

## 2022-03-24 RX ADMIN — LIDOCAINE HYDROCHLORIDE 50 MG: 20 INJECTION, SOLUTION EPIDURAL; INFILTRATION; INTRACAUDAL at 07:03

## 2022-03-24 RX ADMIN — CISATRACURIUM BESYLATE 12 MG: 2 INJECTION INTRAVENOUS at 07:03

## 2022-03-24 RX ADMIN — EPHEDRINE SULFATE 10 MG: 50 INJECTION INTRAVENOUS at 10:03

## 2022-03-24 RX ADMIN — Medication 2 G: at 08:03

## 2022-03-24 RX ADMIN — DOXAZOSIN 8 MG: 8 TABLET ORAL at 09:03

## 2022-03-24 RX ADMIN — SODIUM CHLORIDE: 0.9 INJECTION, SOLUTION INTRAVENOUS at 12:03

## 2022-03-24 RX ADMIN — ONDANSETRON HYDROCHLORIDE 4 MG: 2 INJECTION INTRAMUSCULAR; INTRAVENOUS at 12:03

## 2022-03-24 RX ADMIN — FENTANYL CITRATE 50 MCG: 50 INJECTION INTRAMUSCULAR; INTRAVENOUS at 07:03

## 2022-03-24 RX ADMIN — SODIUM CHLORIDE: 0.9 INJECTION, SOLUTION INTRAVENOUS at 07:03

## 2022-03-24 RX ADMIN — CISATRACURIUM BESYLATE 2 MG: 2 INJECTION INTRAVENOUS at 12:03

## 2022-03-24 RX ADMIN — Medication 2 G: at 12:03

## 2022-03-24 RX ADMIN — GLYCOPYRROLATE 0.4 MG: 0.2 INJECTION, SOLUTION INTRAMUSCULAR; INTRAVITREAL at 12:03

## 2022-03-24 NOTE — NURSING TRANSFER
Nursing Transfer Note      3/24/2022       Transfer To: dialysis then to room 503    Transfer via bed    Transfer with N/A    Transported by PCTs    Medicines sent: none    Any special needs or follow-up needed: no    Chart send with patient: Yes    Notified: daughter    Patient reassessed at: 1454

## 2022-03-24 NOTE — PROGRESS NOTES
HD treatment started. No complications with access to the right IJ catheter. Lines secured and telemetry in place. Complaints of pain in throat.

## 2022-03-24 NOTE — PROGRESS NOTES
HD treatment complete. Duration of treatment 3 hours and 2 L removed. Treatment was tolerated well and no complications with access to the right IJ catheter. Catheter flushed and locked with NS. Capped and taped.

## 2022-03-24 NOTE — CONSULTS
[facility] ([facility])  Nephrology  Consult Note    Patient Name: Kurtis Plata  MRN: [id number]  Admission Date: 3/24/2022  Hospital Length of Stay: 0 days  Attending Provider: Janes Ochoa MD   Primary Care Physician: Anmol Joe MD  Principal Problem:<principal problem not specified>    Inpatient consult to Nephrology  Consult performed by: Emmanuel Dumont NP  Consult ordered by: Joe Bellamy MD  Reason for consult: ESRD    IP consult to Interventional Nephrology  Consult performed by: Emmanuel Dumont NP  Consult ordered by: Joe Bellamy MD  Reason for consult: ESRD        Subjective:     HPI: Kurtis Plata is a 77 y.o. male with left renal mass, ESRD on nightly PD, and HTN. He sees Dr. Shari Mcknight in [place] as his nephrologist. Patient presented today for robotic nephrectomy of left renal mass suspicious for renal cell carcinoma. Nephrology was consulted for management of ESRD.     -Unable to interview patient as he was immediately post-op and still sedated at time of nephrology evaluation.            Past Medical History:   Diagnosis Date    Anemia     BPH (benign prostatic hyperplasia)     CKD (chronic kidney disease)     Colon polyps     Dialysis patient     daily peritoneal    Dysphagia     Esophagitis     ESRD (end stage renal disease)     Gallbladder sludge     Gastritis     Gastrointestinal stromal tumor (GIST)     GERD (gastroesophageal reflux disease)     Gout     Hiatal hernia     HTN (hypertension)     Hyperlipidemia     Iron deficiency     Nephrosclerosis     Overweight     Proteinuria     Renal cysts, acquired, bilateral     Sleep apnea     Vitamin D deficiency        Past Surgical History:   Procedure Laterality Date    COLONOSCOPY W/ POLYPECTOMY  06/2017    ENDOSCOPIC ULTRASOUND OF UPPER GASTROINTESTINAL TRACT N/A 4/20/2021    Procedure: ULTRASOUND, UPPER GI TRACT, ENDOSCOPIC;  Surgeon: Anmol Joe MD;  [facility]  Service:  Endoscopy;  Laterality: N/A;  Covid-19 test 4/17/21 at [place] -   Serum K+ before procedure (dialysis patient) - pg    ESOPHAGOGASTRODUODENOSCOPY  02/16/2021    PERITONEAL CATHETER INSERTION      RENAL BIOPSY  04/12/2017    VASECTOMY         Review of patient's allergies indicates:  No Known Allergies  Current Facility-Administered Medications   Medication Frequency    0.9%  NaCl infusion Once    acetaminophen tablet 1,000 mg Q6H    allopurinol split tablet 150 mg Daily    [START ON 3/25/2022] amLODIPine tablet 10 mg Daily    atorvastatin tablet 20 mg QHS    [START ON 3/25/2022] cinacalcet tablet 30 mg Daily with breakfast    cyanocobalamin tablet 500 mcg BID    diphenhydrAMINE capsule 25 mg Q12H PRN    doxazosin tablet 8 mg QHS    fentaNYL 50 mcg/mL injection 25 mcg Q5 Min PRN    [START ON 3/25/2022] hydroCHLOROthiazide tablet 25 mg Daily    HYDROmorphone injection 0.5 mg Q10 Min PRN    [START ON 3/25/2022] losartan tablet 50 mg Daily    melatonin tablet 6 mg Nightly PRN    methocarbamoL tablet 1,000 mg Q6H PRN    metoclopramide HCl tablet 5 mg QHS    ondansetron injection 4 mg Daily PRN    ondansetron injection 4 mg Q6H PRN    oxyCODONE immediate release tablet 10 mg Q4H PRN    oxyCODONE immediate release tablet 5 mg Q3H PRN    oxyCODONE immediate release tablet 5 mg Q4H PRN    prochlorperazine injection Soln 5 mg Q30 Min PRN    sodium chloride 0.9% flush 10 mL PRN     Family History       Problem Relation (Age of Onset)    Cancer Father, Brother    Hypertension Father, Mother    Kidney disease Father          Tobacco Use    Smoking status: Former Smoker    Smokeless tobacco: Former User    Tobacco comment: Quit 2020   Substance and Sexual Activity    Alcohol use: Not Currently    Drug use: Never    Sexual activity: Not on file     Review of Systems   Unable to perform ROS: Mental status change (sedated)   Objective:     Vital Signs (Most Recent):  Temp: 97.2 °F (36.2 °C)  (03/24/22 1442)  Pulse: 101 (03/24/22 1530)  Resp: 18 (03/24/22 1530)  BP: 136/80 (03/24/22 1530)  SpO2: 97 % (03/24/22 1442)  O2 Device (Oxygen Therapy): room air (03/24/22 1442) Vital Signs (24h Range):  Temp:  [97.2 °F (36.2 °C)-98.2 °F (36.8 °C)] 97.2 °F (36.2 °C)  Pulse:  [] 101  Resp:  [13-19] 18  SpO2:  [97 %-100 %] 97 %  BP: (135-173)/(68-84) 136/80     Weight: 77.6 kg (171 lb) (03/24/22 0612)  Body mass index is 28.46 kg/m².  Body surface area is 1.89 meters squared.    No intake/output data recorded.    Physical Exam  Constitutional:       General: He is not in acute distress.     Appearance: Normal appearance. He is not ill-appearing.   HENT:      Head: Normocephalic and atraumatic.      Nose: Nose normal.      Mouth/Throat:      Mouth: Mucous membranes are moist.      Pharynx: Oropharynx is clear.   Eyes:      Pupils: Pupils are equal, round, and reactive to light.   Cardiovascular:      Rate and Rhythm: Normal rate.      Comments: R IJ trialysis in place   Abdominal:      General: Abdomen is flat.      Palpations: Abdomen is soft.      Comments: PD cath to abdomen    Musculoskeletal:      Cervical back: Normal range of motion and neck supple.      Right lower leg: No edema.      Left lower leg: No edema.   Skin:     General: Skin is warm and dry.   Neurological:      General: No focal deficit present.   Psychiatric:         Mood and Affect: Mood normal.         Behavior: Behavior normal.       Significant Labs:  CBC:   Recent Labs   Lab 03/24/22  1443   WBC 13.15*   RBC 2.55*   HGB 7.2*   HCT 23.3*      MCV 91   MCH 28.2   MCHC 30.9*     CMP:   Recent Labs   Lab 03/24/22  1305   *   CALCIUM 8.2*      K 5.3*   CO2 21*      BUN 46*   CREATININE 7.9*     All labs within the past 24 hours have been reviewed.        Assessment/Plan:     ESRD on peritoneal dialysis  ESRD on PD nightly who underwent L robotic nephrectomy for L renal mass suspicious for RCC. Will need to  transition to iHD until cleared to restart PD.     Plan/Recommendations:     -iHD today for metabolic clearance and volume management   -IR currently on call for permacath placement, will need placed before discharge   -Strict I/O's   -Trend renal function panels daily   -Renally dose meds/avoid nephrotoxic meds   -Keep MAP >65  -Transfuse for Hgb <7.0  -Renal diet/formulations, if not NPO             Thank you for your consult. I will follow-up with patient. Please contact us if you have any additional questions.    Emmanuel Dumont, NP  Nephrology  [facility] (2nd Fl)

## 2022-03-24 NOTE — TRANSFER OF CARE
"Anesthesia Transfer of Care Note    Patient: Kurtis Plata    Procedure(s) Performed: Procedure(s) (LRB):  XI ROBOTIC NEPHRECTOMY/ RETROPERITONEAL (Left)    Patient location: PACU    Anesthesia Type: general    Transport from OR: Transported from OR on 6-10 L/min O2 by face mask with adequate spontaneous ventilation    Post pain: adequate analgesia    Post assessment: no apparent anesthetic complications and tolerated procedure well    Post vital signs: stable    Level of consciousness: sedated    Nausea/Vomiting: no nausea/vomiting    Complications: none    Transfer of care protocol was followed      Last vitals:   Visit Vitals  /68   Pulse 104   Temp 36.2 °C (97.2 °F) (Temporal)   Resp 16   Ht 5' 5" (1.651 m)   Wt 77.6 kg (171 lb)   SpO2 100%   BMI 28.46 kg/m²     "

## 2022-03-24 NOTE — ANESTHESIA POSTPROCEDURE EVALUATION
Anesthesia Post Evaluation    Patient: Kurtis Plata    Procedure(s) Performed: Procedure(s) (LRB):  XI ROBOTIC NEPHRECTOMY/ RETROPERITONEAL (Left)    Final Anesthesia Type: general      Patient location during evaluation: PACU  Patient participation: Yes- Able to Participate  Level of consciousness: awake and alert  Post-procedure vital signs: reviewed and stable  Pain management: adequate  Airway patency: patent  ADEEL mitigation strategies: Extubation while patient is awake  PONV status at discharge: No PONV  Anesthetic complications: no      Cardiovascular status: stable  Respiratory status: unassisted and spontaneous ventilation  Hydration status: euvolemic  Follow-up not needed.          Vitals Value Taken Time   /82 03/24/22 1442   Temp 36.2 °C (97.2 °F) 03/24/22 1442   Pulse 77 03/24/22 1453   Resp 14 03/24/22 1446   SpO2 97 % 03/24/22 1453   Vitals shown include unvalidated device data.      No case tracking events are documented in the log.      Pain/Hina Score: Pain Rating Prior to Med Admin: 7 (3/24/2022  2:00 PM)  Pain Rating Post Med Admin: 4 (3/24/2022  2:50 PM)  Hina Score: 9 (3/24/2022  2:00 PM)

## 2022-03-24 NOTE — HPI
Kurtis Plata is a 77 y.o. male with left renal mass, ESRD on nightly PD, and HTN. He sees Dr. Shari Mcknight in [place] as his nephrologist. Patient presented today for robotic nephrectomy of left renal mass suspicious for renal cell carcinoma. Nephrology was consulted for management of ESRD.     -Unable to interview patient as he was immediately post-op and still sedated at time of nephrology evaluation.

## 2022-03-24 NOTE — OP NOTE
Jonathan Treviño is an 54 year old male presents with bright red blood per rectum and episodes of watery stools here for colonoscopy. Reports last colonoscopy about 2 years ago. Denies any family history of colon cancer or colon polyps    Past Medical History:   Diagnosis Date   • Benign paroxysmal positional vertigo of right ear 09/28/2019   • COVID-19 12/09/2021    nasal congetion, cough,  chest x-ray showed pneumonia, treated with Regeneron, symptoms last 2 weeks    • Depression with suicidal ideation    • Migraine headache    • Obesity    • Obstructive sleep apnea     noncompliant cpap    • Rectal bleeding      Past Surgical History:   Procedure Laterality Date   • Appendectomy     • Cholecystectomy     • Colonoscopy  2020   • Total knee arthroplasty       Family History   Problem Relation Age of Onset   • Diabetes Mother    • Congestive Heart Failure Father      Social History     Tobacco Use   • Smoking status: Current Some Day Smoker     Packs/day: 0.00     Years: 20.00     Pack years: 0.00     Types: Cigars   • Smokeless tobacco: Never Used   Substance Use Topics   • Alcohol use: Yes     Comment: 8-10 per week       Prior to Admission Meds:(Not in a hospital admission)     Current Outpatient Medications   Medication Sig   • buPROPion XL (WELLBUTRIN XL) 150 MG 24 hr tablet Take 1 tablet by mouth daily. Do not start before March 5, 2022.   • traZODone (DESYREL) 50 MG tablet Take 1 tablet by mouth nightly as needed for Sleep.     Current Facility-Administered Medications   Medication   • sodium chloride 0.9% infusion      Scheduled Meds:  • sodium chloride  1,000 mL Intravenous Once     Continuous Infusions:  PRN Meds:    Allergies:   ALLERGIES:   Allergen Reactions   • Ciprofloxacin Other (See Comments)     Numbness in limbs       Active Problems:    * No active hospital problems. *    Blood pressure 130/84, pulse 64, temperature 97.2 °F (36.2 °C), temperature source Temporal, resp. rate (!) 21, height 5' 10\"  [facility] Operative Note    [facility]  Operative Note    Date: 03/24/2022    Pre-Op Diagnosis: left renal mass suspicious for renal cell carcinoma    Post-Op Diagnosis: same    Procedure(s) Performed:   Robotic left nephrectomy    Specimen(s): left Kidney    Staff Surgeon: Janes Ochoa MD    Assistant Surgeon: Joe Bellamy MD, Aroldo Yeboah MD    Bedside Assistant:  Ariel Matthews (no qualified resident available for bedside assistance)    Anesthesia: General endotracheal anesthesia    Indications: Kurtis Plata is a 77 y.o. male with ESRD and left hilar renal mass, he presents today for radical nephrectomy    Findings:   One artery, one vein taken separately with vascular stapler  Left nephrectomy completed via retroperitoneal approach    Estimated Blood Loss: 50ml    Drains: none      CLINICAL HISTORY: The patient was recently noted to have a left renal tumor. The patient was consented to a robotic-assisted nephrectomy with possible conversion to open nephrectomy.     OPERATIVE PROCEDURE:The patient was brought to the operating room, and after identification by name and number, was placed supine on the operating table. General endotracheal anesthesia was administered. An orogastric tube was placed to suction.     The patient was then moved into the full flank position with the left side up. The patient was appropriately padded and secured to the table. The patient was then prepped and draped in the usual sterile fashion.  Timeout was performed and preoperative antibiotics were confirmed.       The location of our camera port was marked one finger breath above the ASIS in the mid axillary line. This was incised sharply and carried down through the subcutaneous tissue. The lumbodorsal fascia was entered using a Tonsil clamp and the retroperitoneum was disected bluntly to ensure there was adequate room for the balloon dilator. The retroperitoneal balloon dilator was inserted  (1.778 m), weight 115.7 kg (255 lb), SpO2 97 %.    General: No acute distress, well appearing, age appropriate  Eyes: PERRLA, anicteric  HENT: Normocephalic, oral mucosa is moist.  Neck: Supple, non-tender, no jugular vein distention  Respiratory: Lungs clear to auscultation bilaterally, respirations non-labored, breath sounds equal, symmetrical chest wall expansion  Cardiovascular: Normal rate, regular rhythm,  no lower extremity pitting edema.  Gastrointestinal : Soft, non-tender, no obvious distension, no organomegly, active bowel sounds.  Musculoskeletal: Grossly normal and symmetric movements in all extremities   Neurologic: Alert and oriented x3     10 point ROS negative unless otherwise mentioned in HPI         Assessment:    Recommend   Colonoscopy with Monitored Anesthesia Care  PROCEDURE Risk  Risks/alternatives were reviewed with the patient including but not limited to adverse risk of aspiration, medication side effect, respiratory depression, bleeding, perforation or splenic rupture/perforation. Patient voiced understanding.      Jailene Khan MD  3/7/2022     through the incision and into the retroperitoneum. This was inflated under direct vision. The psoas was identified and the edge of peritoneum was identified. Once the space was adequately dilated the balloon dilator was deflated and removed. The robotic Claudia port was inserted and the retroperitoneum was insufflated.      Using direct vision the posterior lateral most port site was positioned 6 cm lateral to the camera port. This was inserted under direct vision.     The peritoneum was dropped off of the transversalis muscle on the anterior abdominal wall medially using laparoscopic kitner to create space to place two additional robotic ports. The peritoneum was inadvertently entered.     Two 8mm robotic ports were placed medially in a modified arch cephalad ensuring 6cm of space between ports. The medial most robotic 4th arm was placed through the peritoneal defect to retract it.     A 12 mm air seal assistant port was placed in the left lower quadrant. Each trocar was introduced under direct vision ensuring no injury.     Paranephric fat was mobilized to improve visualization. The Psoas was identified. Intraoperative ultrasound was utilized for orientation, the kidney was visualized using the ultrasound.     Gerota's fascia was entered and the we identified the renal hilum. One renal artery and one renal vein was identified and both were dissected circumferentially.The ureter was identified.    The hilum was then ligated individually using the vascular stapling device.  The remainder of the kidney was then freed from the superior and lateral attachments.  The ureter was clipped and transected.  The adrenal gland was spared during the procedure.     The robot was undocked and all trocars were removed.  The camera port incision was extended to the left lower quadrant assistant port from skin down to fascia to allow removal of the specimen.  This was inspected and found to be in tact.  This was passed off the field  for pathologic analysis.       The extraction site fascia was closed using #1 PDS suture in two layers in a running fashion. All skin incisions were closed using 4-0 monocryl. Dermabond was applied to the incisions.     The patient tolerated the procedure well and was transferred to the recovery room in stable condition.     Disposition: The patient will remain on the urology service overnight for observation.      Joe Bellamy MD

## 2022-03-24 NOTE — ASSESSMENT & PLAN NOTE
ESRD on PD nightly who underwent L robotic nephrectomy for L renal mass suspicious for RCC. Will need to transition to iHD until cleared to restart PD.     Plan/Recommendations:     -iHD today for metabolic clearance and volume management   -IR currently on call for permacath placement, will need placed before discharge   -Strict I/O's   -Trend renal function panels daily   -Renally dose meds/avoid nephrotoxic meds   -Keep MAP >65  -Transfuse for Hgb <7.0  -Renal diet/formulations, if not NPO

## 2022-03-24 NOTE — SUBJECTIVE & OBJECTIVE
Past Medical History:   Diagnosis Date    Anemia     BPH (benign prostatic hyperplasia)     CKD (chronic kidney disease)     Colon polyps     Dialysis patient     daily peritoneal    Dysphagia     Esophagitis     ESRD (end stage renal disease)     Gallbladder sludge     Gastritis     Gastrointestinal stromal tumor (GIST)     GERD (gastroesophageal reflux disease)     Gout     Hiatal hernia     HTN (hypertension)     Hyperlipidemia     Iron deficiency     Nephrosclerosis     Overweight     Proteinuria     Renal cysts, acquired, bilateral     Sleep apnea     Vitamin D deficiency        Past Surgical History:   Procedure Laterality Date    COLONOSCOPY W/ POLYPECTOMY  06/2017    ENDOSCOPIC ULTRASOUND OF UPPER GASTROINTESTINAL TRACT N/A 4/20/2021    Procedure: ULTRASOUND, UPPER GI TRACT, ENDOSCOPIC;  Surgeon: Anmol Joe MD;  Location: [facility] ([address] [place] [address]);  Service: Endoscopy;  Laterality: N/A;  Covid-19 test 4/17/21 at [place] -   Serum K+ before procedure (dialysis patient) - pg    ESOPHAGOGASTRODUODENOSCOPY  02/16/2021    PERITONEAL CATHETER INSERTION      RENAL BIOPSY  04/12/2017    VASECTOMY         Review of patient's allergies indicates:  No Known Allergies  Current Facility-Administered Medications   Medication Frequency    0.9%  NaCl infusion Once    acetaminophen tablet 1,000 mg Q6H    allopurinol split tablet 150 mg Daily    [START ON 3/25/2022] amLODIPine tablet 10 mg Daily    atorvastatin tablet 20 mg QHS    [START ON 3/25/2022] cinacalcet tablet 30 mg Daily with breakfast    cyanocobalamin tablet 500 mcg BID    diphenhydrAMINE capsule 25 mg Q12H PRN    doxazosin tablet 8 mg QHS    fentaNYL 50 mcg/mL injection 25 mcg Q5 Min PRN    [START ON 3/25/2022] hydroCHLOROthiazide tablet 25 mg Daily    HYDROmorphone injection 0.5 mg Q10 Min PRN    [START ON 3/25/2022] losartan tablet 50 mg Daily    melatonin tablet 6 mg Nightly PRN    methocarbamoL tablet 1,000 mg Q6H PRN    metoclopramide HCl tablet 5 mg QHS     ondansetron injection 4 mg Daily PRN    ondansetron injection 4 mg Q6H PRN    oxyCODONE immediate release tablet 10 mg Q4H PRN    oxyCODONE immediate release tablet 5 mg Q3H PRN    oxyCODONE immediate release tablet 5 mg Q4H PRN    prochlorperazine injection Soln 5 mg Q30 Min PRN    sodium chloride 0.9% flush 10 mL PRN     Family History       Problem Relation (Age of Onset)    Cancer Father, Brother    Hypertension Father, Mother    Kidney disease Father          Tobacco Use    Smoking status: Former Smoker    Smokeless tobacco: Former User    Tobacco comment: Quit 2020   Substance and Sexual Activity    Alcohol use: Not Currently    Drug use: Never    Sexual activity: Not on file     Review of Systems   Unable to perform ROS: Mental status change (sedated)   Objective:     Vital Signs (Most Recent):  Temp: 97.2 °F (36.2 °C) (03/24/22 1442)  Pulse: 101 (03/24/22 1530)  Resp: 18 (03/24/22 1530)  BP: 136/80 (03/24/22 1530)  SpO2: 97 % (03/24/22 1442)  O2 Device (Oxygen Therapy): room air (03/24/22 1442) Vital Signs (24h Range):  Temp:  [97.2 °F (36.2 °C)-98.2 °F (36.8 °C)] 97.2 °F (36.2 °C)  Pulse:  [] 101  Resp:  [13-19] 18  SpO2:  [97 %-100 %] 97 %  BP: (135-173)/(68-84) 136/80     Weight: 77.6 kg (171 lb) (03/24/22 0612)  Body mass index is 28.46 kg/m².  Body surface area is 1.89 meters squared.    No intake/output data recorded.    Physical Exam  Constitutional:       General: He is not in acute distress.     Appearance: Normal appearance. He is not ill-appearing.   HENT:      Head: Normocephalic and atraumatic.      Nose: Nose normal.      Mouth/Throat:      Mouth: Mucous membranes are moist.      Pharynx: Oropharynx is clear.   Eyes:      Pupils: Pupils are equal, round, and reactive to light.   Cardiovascular:      Rate and Rhythm: Normal rate.      Comments: R IJ trialysis in place   Abdominal:      General: Abdomen is flat.      Palpations: Abdomen is soft.      Comments: PD cath to abdomen     Musculoskeletal:      Cervical back: Normal range of motion and neck supple.      Right lower leg: No edema.      Left lower leg: No edema.   Skin:     General: Skin is warm and dry.   Neurological:      General: No focal deficit present.   Psychiatric:         Mood and Affect: Mood normal.         Behavior: Behavior normal.       Significant Labs:  CBC:   Recent Labs   Lab 03/24/22  1443   WBC 13.15*   RBC 2.55*   HGB 7.2*   HCT 23.3*      MCV 91   MCH 28.2   MCHC 30.9*     CMP:   Recent Labs   Lab 03/24/22  1305   *   CALCIUM 8.2*      K 5.3*   CO2 21*      BUN 46*   CREATININE 7.9*     All labs within the past 24 hours have been reviewed.

## 2022-03-25 LAB
ANION GAP SERPL CALC-SCNC: 10 MMOL/L (ref 8–16)
ANION GAP SERPL CALC-SCNC: 12 MMOL/L (ref 8–16)
ANION GAP SERPL CALC-SCNC: 12 MMOL/L (ref 8–16)
ANION GAP SERPL CALC-SCNC: 6 MMOL/L (ref 8–16)
ANION GAP SERPL CALC-SCNC: 9 MMOL/L (ref 8–16)
BASOPHILS # BLD AUTO: 0.02 K/UL (ref 0–0.2)
BASOPHILS NFR BLD: 0.1 % (ref 0–1.9)
BUN SERPL-MCNC: 31 MG/DL (ref 8–23)
BUN SERPL-MCNC: 34 MG/DL (ref 8–23)
BUN SERPL-MCNC: 36 MG/DL (ref 8–23)
BUN SERPL-MCNC: 37 MG/DL (ref 8–23)
BUN SERPL-MCNC: 37 MG/DL (ref 8–23)
CALCIUM SERPL-MCNC: 9 MG/DL (ref 8.7–10.5)
CALCIUM SERPL-MCNC: 9.2 MG/DL (ref 8.7–10.5)
CALCIUM SERPL-MCNC: 9.4 MG/DL (ref 8.7–10.5)
CALCIUM SERPL-MCNC: 9.4 MG/DL (ref 8.7–10.5)
CALCIUM SERPL-MCNC: 9.5 MG/DL (ref 8.7–10.5)
CHLORIDE SERPL-SCNC: 100 MMOL/L (ref 95–110)
CHLORIDE SERPL-SCNC: 100 MMOL/L (ref 95–110)
CHLORIDE SERPL-SCNC: 101 MMOL/L (ref 95–110)
CHLORIDE SERPL-SCNC: 101 MMOL/L (ref 95–110)
CHLORIDE SERPL-SCNC: 99 MMOL/L (ref 95–110)
CO2 SERPL-SCNC: 26 MMOL/L (ref 23–29)
CO2 SERPL-SCNC: 26 MMOL/L (ref 23–29)
CO2 SERPL-SCNC: 27 MMOL/L (ref 23–29)
CO2 SERPL-SCNC: 28 MMOL/L (ref 23–29)
CO2 SERPL-SCNC: 31 MMOL/L (ref 23–29)
CREAT SERPL-MCNC: 5.7 MG/DL (ref 0.5–1.4)
CREAT SERPL-MCNC: 6.1 MG/DL (ref 0.5–1.4)
CREAT SERPL-MCNC: 6.7 MG/DL (ref 0.5–1.4)
CREAT SERPL-MCNC: 6.8 MG/DL (ref 0.5–1.4)
CREAT SERPL-MCNC: 6.8 MG/DL (ref 0.5–1.4)
DIFFERENTIAL METHOD: ABNORMAL
EOSINOPHIL # BLD AUTO: 0 K/UL (ref 0–0.5)
EOSINOPHIL NFR BLD: 0 % (ref 0–8)
ERYTHROCYTE [DISTWIDTH] IN BLOOD BY AUTOMATED COUNT: 16.5 % (ref 11.5–14.5)
EST. GFR  (AFRICAN AMERICAN): 10.1 ML/MIN/1.73 M^2
EST. GFR  (AFRICAN AMERICAN): 8.2 ML/MIN/1.73 M^2
EST. GFR  (AFRICAN AMERICAN): 8.2 ML/MIN/1.73 M^2
EST. GFR  (AFRICAN AMERICAN): 8.3 ML/MIN/1.73 M^2
EST. GFR  (AFRICAN AMERICAN): 9.3 ML/MIN/1.73 M^2
EST. GFR  (NON AFRICAN AMERICAN): 7.1 ML/MIN/1.73 M^2
EST. GFR  (NON AFRICAN AMERICAN): 7.1 ML/MIN/1.73 M^2
EST. GFR  (NON AFRICAN AMERICAN): 7.2 ML/MIN/1.73 M^2
EST. GFR  (NON AFRICAN AMERICAN): 8.1 ML/MIN/1.73 M^2
EST. GFR  (NON AFRICAN AMERICAN): 8.8 ML/MIN/1.73 M^2
GLUCOSE SERPL-MCNC: 102 MG/DL (ref 70–110)
GLUCOSE SERPL-MCNC: 105 MG/DL (ref 70–110)
GLUCOSE SERPL-MCNC: 105 MG/DL (ref 70–110)
GLUCOSE SERPL-MCNC: 56 MG/DL (ref 70–110)
GLUCOSE SERPL-MCNC: 56 MG/DL (ref 70–110)
HCT VFR BLD AUTO: 24.1 % (ref 40–54)
HGB BLD-MCNC: 7.5 G/DL (ref 14–18)
IMM GRANULOCYTES # BLD AUTO: 0.05 K/UL (ref 0–0.04)
IMM GRANULOCYTES NFR BLD AUTO: 0.4 % (ref 0–0.5)
INR PPP: 1.1 (ref 0.8–1.2)
LYMPHOCYTES # BLD AUTO: 1.4 K/UL (ref 1–4.8)
LYMPHOCYTES NFR BLD: 9.8 % (ref 18–48)
MAGNESIUM SERPL-MCNC: 2 MG/DL (ref 1.6–2.6)
MCH RBC QN AUTO: 27.3 PG (ref 27–31)
MCHC RBC AUTO-ENTMCNC: 31.1 G/DL (ref 32–36)
MCV RBC AUTO: 88 FL (ref 82–98)
MONOCYTES # BLD AUTO: 1.4 K/UL (ref 0.3–1)
MONOCYTES NFR BLD: 10.1 % (ref 4–15)
NEUTROPHILS # BLD AUTO: 11.3 K/UL (ref 1.8–7.7)
NEUTROPHILS NFR BLD: 79.6 % (ref 38–73)
NRBC BLD-RTO: 0 /100 WBC
PHOSPHATE SERPL-MCNC: 2.3 MG/DL (ref 2.7–4.5)
PLATELET # BLD AUTO: 218 K/UL (ref 150–450)
PMV BLD AUTO: 11.1 FL (ref 9.2–12.9)
POCT GLUCOSE: 105 MG/DL (ref 70–110)
POCT GLUCOSE: 97 MG/DL (ref 70–110)
POTASSIUM SERPL-SCNC: 5.1 MMOL/L (ref 3.5–5.1)
POTASSIUM SERPL-SCNC: 5.6 MMOL/L (ref 3.5–5.1)
POTASSIUM SERPL-SCNC: 5.9 MMOL/L (ref 3.5–5.1)
POTASSIUM SERPL-SCNC: 5.9 MMOL/L (ref 3.5–5.1)
POTASSIUM SERPL-SCNC: 6 MMOL/L (ref 3.5–5.1)
POTASSIUM SERPL-SCNC: 6 MMOL/L (ref 3.5–5.1)
PROTHROMBIN TIME: 11.4 SEC (ref 9–12.5)
RBC # BLD AUTO: 2.75 M/UL (ref 4.6–6.2)
SODIUM SERPL-SCNC: 136 MMOL/L (ref 136–145)
SODIUM SERPL-SCNC: 137 MMOL/L (ref 136–145)
SODIUM SERPL-SCNC: 137 MMOL/L (ref 136–145)
SODIUM SERPL-SCNC: 139 MMOL/L (ref 136–145)
SODIUM SERPL-SCNC: 139 MMOL/L (ref 136–145)
WBC # BLD AUTO: 14.19 K/UL (ref 3.9–12.7)

## 2022-03-25 PROCEDURE — 25000003 PHARM REV CODE 250: Performed by: STUDENT IN AN ORGANIZED HEALTH CARE EDUCATION/TRAINING PROGRAM

## 2022-03-25 PROCEDURE — 94640 AIRWAY INHALATION TREATMENT: CPT

## 2022-03-25 PROCEDURE — 36415 COLL VENOUS BLD VENIPUNCTURE: CPT | Performed by: STUDENT IN AN ORGANIZED HEALTH CARE EDUCATION/TRAINING PROGRAM

## 2022-03-25 PROCEDURE — 93010 ELECTROCARDIOGRAM REPORT: CPT | Mod: ,,, | Performed by: INTERNAL MEDICINE

## 2022-03-25 PROCEDURE — 80100014 HC HEMODIALYSIS 1:1

## 2022-03-25 PROCEDURE — 85610 PROTHROMBIN TIME: CPT | Performed by: STUDENT IN AN ORGANIZED HEALTH CARE EDUCATION/TRAINING PROGRAM

## 2022-03-25 PROCEDURE — 94799 UNLISTED PULMONARY SVC/PX: CPT

## 2022-03-25 PROCEDURE — 63600175 PHARM REV CODE 636 W HCPCS: Performed by: STUDENT IN AN ORGANIZED HEALTH CARE EDUCATION/TRAINING PROGRAM

## 2022-03-25 PROCEDURE — 94761 N-INVAS EAR/PLS OXIMETRY MLT: CPT

## 2022-03-25 PROCEDURE — 80048 BASIC METABOLIC PNL TOTAL CA: CPT | Mod: 91 | Performed by: STUDENT IN AN ORGANIZED HEALTH CARE EDUCATION/TRAINING PROGRAM

## 2022-03-25 PROCEDURE — 11000001 HC ACUTE MED/SURG PRIVATE ROOM

## 2022-03-25 PROCEDURE — 25000242 PHARM REV CODE 250 ALT 637 W/ HCPCS: Performed by: STUDENT IN AN ORGANIZED HEALTH CARE EDUCATION/TRAINING PROGRAM

## 2022-03-25 PROCEDURE — 99900035 HC TECH TIME PER 15 MIN (STAT)

## 2022-03-25 PROCEDURE — 63600175 PHARM REV CODE 636 W HCPCS: Performed by: RADIOLOGY

## 2022-03-25 PROCEDURE — 25000003 PHARM REV CODE 250: Performed by: UROLOGY

## 2022-03-25 PROCEDURE — 80048 BASIC METABOLIC PNL TOTAL CA: CPT | Performed by: STUDENT IN AN ORGANIZED HEALTH CARE EDUCATION/TRAINING PROGRAM

## 2022-03-25 PROCEDURE — 83735 ASSAY OF MAGNESIUM: CPT | Performed by: STUDENT IN AN ORGANIZED HEALTH CARE EDUCATION/TRAINING PROGRAM

## 2022-03-25 PROCEDURE — 84100 ASSAY OF PHOSPHORUS: CPT | Performed by: STUDENT IN AN ORGANIZED HEALTH CARE EDUCATION/TRAINING PROGRAM

## 2022-03-25 PROCEDURE — 93005 ELECTROCARDIOGRAM TRACING: CPT

## 2022-03-25 PROCEDURE — 93010 EKG 12-LEAD: ICD-10-PCS | Mod: ,,, | Performed by: INTERNAL MEDICINE

## 2022-03-25 PROCEDURE — 85025 COMPLETE CBC W/AUTO DIFF WBC: CPT | Performed by: STUDENT IN AN ORGANIZED HEALTH CARE EDUCATION/TRAINING PROGRAM

## 2022-03-25 RX ORDER — FENTANYL CITRATE 50 UG/ML
INJECTION, SOLUTION INTRAMUSCULAR; INTRAVENOUS CODE/TRAUMA/SEDATION MEDICATION
Status: COMPLETED | OUTPATIENT
Start: 2022-03-25 | End: 2022-03-25

## 2022-03-25 RX ORDER — ALBUTEROL SULFATE 2.5 MG/.5ML
10 SOLUTION RESPIRATORY (INHALATION) ONCE
Status: COMPLETED | OUTPATIENT
Start: 2022-03-25 | End: 2022-03-25

## 2022-03-25 RX ORDER — CEFAZOLIN SODIUM 1 G/50ML
SOLUTION INTRAVENOUS
Status: COMPLETED | OUTPATIENT
Start: 2022-03-25 | End: 2022-03-25

## 2022-03-25 RX ORDER — MUPIROCIN 20 MG/G
OINTMENT TOPICAL 2 TIMES DAILY
Status: DISPENSED | OUTPATIENT
Start: 2022-03-25 | End: 2022-03-30

## 2022-03-25 RX ORDER — SODIUM CHLORIDE 9 MG/ML
INJECTION, SOLUTION INTRAVENOUS ONCE
Status: DISCONTINUED | OUTPATIENT
Start: 2022-03-26 | End: 2022-03-25

## 2022-03-25 RX ORDER — CALCIUM CARBONATE 200(500)MG
500 TABLET,CHEWABLE ORAL 3 TIMES DAILY PRN
Status: DISCONTINUED | OUTPATIENT
Start: 2022-03-25 | End: 2022-03-31 | Stop reason: HOSPADM

## 2022-03-25 RX ORDER — MIDAZOLAM HYDROCHLORIDE 1 MG/ML
INJECTION INTRAMUSCULAR; INTRAVENOUS CODE/TRAUMA/SEDATION MEDICATION
Status: COMPLETED | OUTPATIENT
Start: 2022-03-25 | End: 2022-03-25

## 2022-03-25 RX ORDER — PANTOPRAZOLE SODIUM 40 MG/1
40 TABLET, DELAYED RELEASE ORAL DAILY
Status: DISCONTINUED | OUTPATIENT
Start: 2022-03-25 | End: 2022-03-27

## 2022-03-25 RX ORDER — HEPARIN SODIUM 1000 [USP'U]/ML
INJECTION, SOLUTION INTRAVENOUS; SUBCUTANEOUS CODE/TRAUMA/SEDATION MEDICATION
Status: COMPLETED | OUTPATIENT
Start: 2022-03-25 | End: 2022-03-25

## 2022-03-25 RX ORDER — SODIUM CHLORIDE 9 MG/ML
INJECTION, SOLUTION INTRAVENOUS ONCE
Status: DISCONTINUED | OUTPATIENT
Start: 2022-03-25 | End: 2022-03-26

## 2022-03-25 RX ADMIN — MUPIROCIN: 20 OINTMENT TOPICAL at 10:03

## 2022-03-25 RX ADMIN — OXYCODONE HYDROCHLORIDE 10 MG: 10 TABLET ORAL at 03:03

## 2022-03-25 RX ADMIN — ALBUTEROL SULFATE 10 MG: 2.5 SOLUTION RESPIRATORY (INHALATION) at 09:03

## 2022-03-25 RX ADMIN — INSULIN HUMAN 7.76 UNITS: 100 INJECTION, SOLUTION PARENTERAL at 05:03

## 2022-03-25 RX ADMIN — HEPARIN SODIUM 1.8 UNITS: 1000 INJECTION INTRAVENOUS; SUBCUTANEOUS at 02:03

## 2022-03-25 RX ADMIN — FENTANYL CITRATE 50 MCG: 0.05 INJECTION, SOLUTION INTRAMUSCULAR; INTRAVENOUS at 02:03

## 2022-03-25 RX ADMIN — ALLOPURINOL 150 MG: 300 TABLET ORAL at 01:03

## 2022-03-25 RX ADMIN — MIDAZOLAM HYDROCHLORIDE 0.5 MG: 1 INJECTION, SOLUTION INTRAMUSCULAR; INTRAVENOUS at 02:03

## 2022-03-25 RX ADMIN — LOSARTAN POTASSIUM 50 MG: 50 TABLET, FILM COATED ORAL at 10:03

## 2022-03-25 RX ADMIN — HYDROCHLOROTHIAZIDE 25 MG: 25 TABLET ORAL at 10:03

## 2022-03-25 RX ADMIN — CALCIUM CARBONATE (ANTACID) CHEW TAB 500 MG 500 MG: 500 CHEW TAB at 10:03

## 2022-03-25 RX ADMIN — CALCIUM GLUCONATE 1 G: 98 INJECTION, SOLUTION INTRAVENOUS at 10:03

## 2022-03-25 RX ADMIN — ACETAMINOPHEN 1000 MG: 500 TABLET ORAL at 06:03

## 2022-03-25 RX ADMIN — CEFAZOLIN SODIUM 1 G: 1 SOLUTION INTRAVENOUS at 02:03

## 2022-03-25 RX ADMIN — CYANOCOBALAMIN TAB 250 MCG 500 MCG: 250 TAB at 10:03

## 2022-03-25 RX ADMIN — SODIUM ZIRCONIUM CYCLOSILICATE 10 G: 10 POWDER, FOR SUSPENSION ORAL at 10:03

## 2022-03-25 RX ADMIN — CINACALCET 30 MG: 30 TABLET, FILM COATED ORAL at 07:03

## 2022-03-25 RX ADMIN — ALBUTEROL SULFATE 10 MG: 2.5 SOLUTION RESPIRATORY (INHALATION) at 04:03

## 2022-03-25 RX ADMIN — HEPARIN SODIUM 5000 UNITS: 5000 INJECTION INTRAVENOUS; SUBCUTANEOUS at 01:03

## 2022-03-25 RX ADMIN — AMLODIPINE BESYLATE 10 MG: 10 TABLET ORAL at 10:03

## 2022-03-25 RX ADMIN — PANTOPRAZOLE SODIUM 40 MG: 40 TABLET, DELAYED RELEASE ORAL at 03:03

## 2022-03-25 RX ADMIN — HEPARIN SODIUM 5000 UNITS: 5000 INJECTION INTRAVENOUS; SUBCUTANEOUS at 06:03

## 2022-03-25 RX ADMIN — DEXTROSE 250 ML: 10 SOLUTION INTRAVENOUS at 06:03

## 2022-03-25 RX ADMIN — ACETAMINOPHEN 1000 MG: 500 TABLET ORAL at 12:03

## 2022-03-25 NOTE — SUBJECTIVE & OBJECTIVE
Interval History: NAEON. AFVSS. Slight tachycardia overnight. HD yesterday via trialysis. Ambulated in room. Pain controlled. Tolerated diet last night. NPO since midnight.      Objective:     Temp:  [97.2 °F (36.2 °C)-99 °F (37.2 °C)] 99 °F (37.2 °C)  Pulse:  [] 102  Resp:  [13-19] 16  SpO2:  [96 %-100 %] 96 %  BP: (119-173)/(68-99) 151/70     Body mass index is 28.46 kg/m².           Drains       Drain  Duration                  Hemodialysis Catheter 03/24/22 0908 <1 day                    Physical Exam  Constitutional:       General: He is not in acute distress.     Appearance: He is not diaphoretic.   HENT:      Head: Normocephalic and atraumatic.      Nose: Nose normal.   Eyes:      Conjunctiva/sclera: Conjunctivae normal.   Cardiovascular:      Rate and Rhythm: Normal rate.   Pulmonary:      Effort: Pulmonary effort is normal. No respiratory distress.   Abdominal:      General: There is no distension.      Tenderness: There is no abdominal tenderness.      Comments: Lap incisions c/d/i   Musculoskeletal:         General: Normal range of motion.      Cervical back: Normal range of motion.   Skin:     General: Skin is dry.   Neurological:      Mental Status: He is alert.   Psychiatric:         Behavior: Behavior normal.         Thought Content: Thought content normal.       Significant Labs:    BMP:  Recent Labs   Lab 03/24/22  1305 03/25/22  0609    137   K 5.3* 5.6*    100   CO2 21* 28   BUN 46* 31*   CREATININE 7.9* 5.7*   CALCIUM 8.2* 9.0       CBC:   Recent Labs   Lab 03/24/22  1305 03/24/22  1443   WBC 12.98* 13.15*   HGB 6.7* 7.2*   HCT 22.4* 23.3*    210       All pertinent labs results from the past 24 hours have been reviewed.    Significant Imaging:  All pertinent imaging results/findings from the past 24 hours have been reviewed.

## 2022-03-25 NOTE — PLAN OF CARE
Post-procedural monitoring completed. Patient AAOx3, no distress noted, respirations even and unlabored, vss, will continue to monitor. Patient to be transferred back to bed 503 via patient transporter. Patient stable for transport.

## 2022-03-25 NOTE — PLAN OF CARE
Pt arrived to IR Room 190 for HD catheter placement. Pt oriented to unit and staff. Plan of care reviewed with patient, patient verbalizes understanding. Comfort measures utilized. Pt safely transferred from stretcher to procedural table. Fall risk reviewed with patient, fall risk interventions maintained. Safety strap applied, positioner pillows utilized to minimize pressure points. Blankets applied. Pt prepped and draped utilizing standard sterile technique. Patient placed on continuous monitoring, as required by sedation policy. Timeouts completed utilizing standard universal time-out, per department and facility policy. RN to remain at bedside, continuous monitoring maintained. Pt resting comfortably. Denies pain/discomfort. Will continue to monitor. See flow sheets for monitoring, medication administration, and updates.

## 2022-03-25 NOTE — PLAN OF CARE
Procedure completed. Patient tolerated well; VSS. Site clean, dry and intact. Patient to be transported to MPU for sedation recovery; report to be given at the bedside. Report to also be called to inpatient RN.

## 2022-03-25 NOTE — PLAN OF CARE
[facility]  Initial Discharge Assessment       Primary Care Provider: Anmol Joe MD    Admission Diagnosis: Renal mass, left [N28.89]  Renal mass [N28.89]    Admission Date: 3/24/2022  Expected Discharge Date: 3/25/2022         Payor: HU[employer] MANAGED MEDICARE / Plan: HU[employer] MEDICARE PPO / Product Type: Medicare Advantage /     Extended Emergency Contact Information  Primary Emergency Contact: Chandni Gonzalez  Mobile Phone: [phone]  Relation: Daughter  Preferred language: English   needed? No  Secondary Emergency Contact: Zahida Eden  Mobile Phone: [phone]  Relation: Grandchild    Discharge Plan A: Home with family  Discharge Plan B: Home with family      [place] [facility] - [place] - [address]  [address]  [place] [address]  Phone: [phone] Fax: [phone]      Initial Assessment (most recent)     Adult Discharge Assessment - 03/25/22 1015        Discharge Assessment    Assessment Type Discharge Planning Assessment     Confirmed/corrected address, phone number and insurance Yes     Confirmed Demographics Correct on Facesheet     Source of Information patient;family     Does patient/caregiver understand observation status Yes     Communicated HAIR with patient/caregiver Yes     Lives With spouse     Do you expect to return to your current living situation? Yes     Do you have help at home or someone to help you manage your care at home? Yes     Who are your caregiver(s) and their phone number(s)? Chandni Gonzalez (Daughter) [phone]     Prior to hospitilization cognitive status: Alert/Oriented     Current cognitive status: Alert/Oriented     Walking or Climbing Stairs Difficulty none     Dressing/Bathing Difficulty none     Home Layout Able to live on 1st floor     Equipment Currently Used at Home none     Readmission within 30 days? No     Patient currently being followed by outpatient case management? No     Do you currently have service(s) that help you manage  your care at home? No     Is the pt/caregiver preference to resume services with current agency No     Do you take prescription medications? Yes     Do you have prescription coverage? Yes     Do you have any problems affording any of your prescribed medications? No     Is the patient taking medications as prescribed? yes     Who is going to help you get home at discharge? Daughter and spouse     How do you get to doctors appointments? family or friend will provide     Are you on dialysis? Yes     Dialysis Name and Scheduled days [facility]22 @ 9AM     Do you take coumadin? No     Discharge Plan A Home with family     Discharge Plan B Home with family               Spoke with patient and daughter at bedside to complete d/c planning assessment. Patient lives with his spouse in a single story home with 4 steps to enter. Independent without equipment. PCP and Pharmacy verified. Patient was doing Peritoneal dialysis daily prior to admission. Will need a Permacath placed prior to d/c today so patient can complete HD for the next 4-6 weeks following nephrectomy. Spoke with Nurse Deandre @ [facility] [place] [address]. Patient to begin HD treatments in clinic on 3/28 @ 9am. Facility requesting op note and d/c summary. D/c summary not available until discharge. Will send complete records as requested when available. Patient's daughter will provide transportation to HD, Outpatient appts and home when ready. Will continue to follow.

## 2022-03-25 NOTE — NURSING
0497- patient request something additional for heartburn, daughter at the bedside; MD notihfied also patient inquired about procedure for tunneled cath    1400- patient ambulating in spencer with daughter, writer assisted with a walker d/t patient veering towards the wall and more forgetful, per daughter. He weas assisted to his room where he sat in a recliner until 1410- and was picked up for his procedure   Alert and oriented, no focal deficits, no motor or sensory deficits.

## 2022-03-25 NOTE — ASSESSMENT & PLAN NOTE
Kurtis Plata is an 77 y.o. male that is s/p Left robotic retroperitoneal radical nephrectomy on 3/24/22. Doing well post-op.    - Pain - controlled  - Diet - tolerated yesterday. NPO since midnight   - OOB  - Encourage IS  - DVT ppx   - F/u labs  - Will discuss with IR about permacath today    - Dispo: Plan for possible permacath today. Plan for discharge tomorrow

## 2022-03-25 NOTE — NURSING
Writer called [facility] and spoke with Sho to confirm dose of insulin in ml since needless syringe not avail in units for insulin injection. Concentration of Reg insulin is 100/ml; ordered dose of 7.76units /100units/ml = .0776 ml; dose visually verified with both Charge RN's and Pharmacist on the phone

## 2022-03-25 NOTE — RESPIRATORY THERAPY
RAPID RESPONSE RESPIRATORY CHART CHECK       Chart check completed no concerns at this time.  Oxygen order discontinued.

## 2022-03-25 NOTE — NURSING
Patient has family in the room with him, he is much more alert since eating, Family explained hyperkalemia protocol. He was able to ambulate with family without use of walker.

## 2022-03-25 NOTE — CARE UPDATE
DANNY reached out to [facility] and spoke with CM who stated that pt is set up to return there on Monday for HD treatments and they are aware of his current need to have PD placed on hold temporarily. CM asked for updated records. DANNY faxed records to clinic. Will update clinic with official d/c date for patient. Will continue to follow.     Anastasia Caruso  Nephrology DANNY  Ext. [phone]

## 2022-03-25 NOTE — H&P
Vascular and Interventional Radiology History & Physical    Date:  3/25/2022      History of Present Illness:  Kurtis Plata is a 77 y.o. male w/ ESRD s/p L nephrectomy for renal mass. IR consulted for permacath placement for HD.     Past Medical History:  Past Medical History:   Diagnosis Date    Anemia     BPH (benign prostatic hyperplasia)     CKD (chronic kidney disease)     Colon polyps     Dialysis patient     daily peritoneal    Dysphagia     Esophagitis     ESRD (end stage renal disease)     Gallbladder sludge     Gastritis     Gastrointestinal stromal tumor (GIST)     GERD (gastroesophageal reflux disease)     Gout     Hiatal hernia     HTN (hypertension)     Hyperlipidemia     Iron deficiency     Nephrosclerosis     Overweight     Proteinuria     Renal cysts, acquired, bilateral     Sleep apnea     Vitamin D deficiency        Past Surgical History:  Past Surgical History:   Procedure Laterality Date    COLONOSCOPY W/ POLYPECTOMY  06/2017    ENDOSCOPIC ULTRASOUND OF UPPER GASTROINTESTINAL TRACT N/A 4/20/2021    Procedure: ULTRASOUND, UPPER GI TRACT, ENDOSCOPIC;  Surgeon: Anmol Joe MD;  Location: [facility] ([address] [place] [address]);  Service: Endoscopy;  Laterality: N/A;  Covid-19 test 4/17/21 at [facility] - pg  Serum K+ before procedure (dialysis patient) - pg    ESOPHAGOGASTRODUODENOSCOPY  02/16/2021    LAPAROSCOPIC ROBOT-ASSISTED SURGICAL REMOVAL OF KIDNEY USING DA TERRI XI Left 3/24/2022    Procedure: XI ROBOTIC NEPHRECTOMY/ RETROPERITONEAL;  Surgeon: Janes Ochoa MD;  Location: [facility] OR [address] [place] [address];  Service: Urology;  Laterality: Left;  4HR/ GEN WITH REGIONAL    PERITONEAL CATHETER INSERTION      RENAL BIOPSY  04/12/2017    VASECTOMY          Sedation History:    Denies any adverse reactions.  Denies problems laying flat.    Social History:  Social History     Tobacco Use    Smoking status: Former Smoker    Smokeless tobacco: Former User    Tobacco comment: Quit 2020   Substance  Use Topics    Alcohol use: Not Currently    Drug use: Never        Home Medications:   Prior to Admission medications    Medication Sig Start Date End Date Taking? Authorizing Provider   allopurinoL (ZYLOPRIM) 100 MG tablet Take 150 mg by mouth once daily.   Yes Historical Provider   amLODIPine (NORVASC) 10 MG tablet Take 10 mg by mouth once daily.   Yes Historical Provider   atorvastatin (LIPITOR) 40 MG tablet Take 20 mg by mouth every evening.   Yes Historical Provider   cinacalcet (SENSIPAR) 30 MG Tab TAKE ONE TABLET BY MOUTH DAILY FOR CHRONIC KIDNEY DISEASE. TAKE WITH FOOD. 1/20/21  Yes Historical Provider   cyanocobalamin 500 MCG tablet Take 500 mcg by mouth 2 (two) times a day.   Yes Historical Provider   doxazosin (CARDURA) 8 MG Tab Take 8 mg by mouth every evening.   Yes Historical Provider   ergocalciferol (ERGOCALCIFEROL) 50,000 unit Cap Take 50,000 Units by mouth every 14 (fourteen) days.   Yes Historical Provider   ferrous sulfate (FEOSOL) 325 mg (65 mg iron) Tab tablet Take 325 mg by mouth 2 (two) times daily.   Yes Historical Provider   finasteride (PROSCAR) 5 mg tablet Take 5 mg by mouth once daily.   Yes Historical Provider   gentamicin (GARAMYCIN) 0.1 % cream APPLY SMALL AMOUNT TOPICALLY DAILY FOR INFECTION 2/23/21  Yes Historical Provider   hydroCHLOROthiazide (HYDRODIURIL) 25 MG tablet Take 25 mg by mouth once daily.   Yes Historical Provider   lactulose (CHRONULAC) 10 gram/15 mL solution TAKE 20GM/30ML BY MOUTH EVERY 6 HOURS AS NEEDED FOR CONSTIPATION * TAKE 2 TABLESPOONFULS AS DIRECTED * 8/3/21  Yes Historical Provider   losartan (COZAAR) 100 MG tablet Take 50 mg by mouth once daily.   Yes Historical Provider   magnesium oxide (MAG-OX) 400 mg (241.3 mg magnesium) tablet TAKE ONE TABLET BY MOUTH DAILY AS A MAGNESIUM SUPPLEMENT 1/26/21  Yes Historical Provider   metoclopramide HCl (REGLAN) 5 MG tablet TAKE ONE TABLET BY MOUTH EVERY NIGHT AT BEDTIME FOR STOMACH 1/26/21  Yes Historical Provider    potassium chloride (KLOR-CON) 20 mEq Pack TAKE 2 PACKETS BY MOUTH TWICE A DAY AS A POTASSIUM SUPPLEMENT TAKE TWICE A DAY FOR 5 DAYS AND THEN ONE A DAY AFTERWARDS WITH YOUT HCTZ 10/23/20  Yes Historical Provider   vitamin D (VITAMIN D3) 1000 units Tab Take 1,000 Units by mouth once daily.   Yes Historical Provider   darbepoetin lillian in polysorbat (ARANESP, IN POLYSORBATE,) 10 mcg/0.4 mL Syrg Inject as directed. Monthly.    Historical Provider   tamsulosin (FLOMAX) 0.4 mg Cap Take 0.4 mg by mouth once daily.    Historical Provider       Inpatient Medications:    Current Facility-Administered Medications:     0.9%  NaCl infusion (for blood administration), , Intravenous, Q24H PRN, Jamison Sheets MD    acetaminophen tablet 1,000 mg, 1,000 mg, Oral, Q6H, Joe Bellamy MD, 1,000 mg at 03/25/22 0603    allopurinol split tablet 150 mg, 150 mg, Oral, Daily, Joe Bellamy MD    amLODIPine tablet 10 mg, 10 mg, Oral, Daily, Joe Bellamy MD    atorvastatin tablet 20 mg, 20 mg, Oral, QHS, Joe Bellamy MD, 20 mg at 03/24/22 2133    cinacalcet tablet 30 mg, 30 mg, Oral, Daily with breakfast, Joe Bellamy MD, 30 mg at 03/25/22 0744    cyanocobalamin tablet 500 mcg, 500 mcg, Oral, BID, Joe Bellamy MD, 500 mcg at 03/24/22 2132    diphenhydrAMINE capsule 25 mg, 25 mg, Oral, Q12H PRN, Joe Bellamy MD    doxazosin tablet 8 mg, 8 mg, Oral, QHS, Joe Bellamy MD, 8 mg at 03/24/22 2132    heparin (porcine) injection 5,000 Units, 5,000 Units, Subcutaneous, Q8H, Joe Bellamy MD, 5,000 Units at 03/25/22 0604    hydroCHLOROthiazide tablet 25 mg, 25 mg, Oral, Daily, Joe Bellamy MD    losartan tablet 50 mg, 50 mg, Oral, Daily, Joe Bellamy MD    melatonin tablet 6 mg, 6 mg, Oral, Nightly PRN, Joe Bellamy MD    methocarbamoL tablet 1,000 mg, 1,000 mg, Oral, Q6H PRN, Jeo Bellamy MD    metoclopramide HCl tablet 5 mg, 5 mg, Oral, QHS, Joe Bellamy MD, 5 mg at 03/24/22 2132    ondansetron injection 4 mg, 4 mg, Intravenous, Q6H PRN,  Joe Bellaym MD    oxyCODONE immediate release tablet 5 mg, 5 mg, Oral, Q4H PRN, Joe Bellamy MD    oxyCODONE immediate release tablet Tab 10 mg, 10 mg, Oral, Q4H PRN, Joe Bellamy MD, 10 mg at 03/25/22 0326    pantoprazole EC tablet 40 mg, 40 mg, Oral, Daily, Kevin Rogers MD, 40 mg at 03/25/22 0344    sodium chloride 0.9% flush 10 mL, 10 mL, Intravenous, PRN, Joe Bellamy MD     Anticoagulants/Antiplatelets:   no anticoagulation    Allergies:   Review of patient's allergies indicates:  No Known Allergies    Review of Systems:   As documented in primary provider H&P.    Vital Signs (Most Recent):  Temp: 99 °F (37.2 °C) (03/25/22 0523)  Pulse: 102 (03/25/22 0523)  Resp: 16 (03/25/22 0523)  BP: (!) 151/70 (03/25/22 0523)  SpO2: 96 % (03/25/22 0523)    Physical Exam:  No acute distress, laying comfortably in bed, pleasant and cooperative  Regular rate and rhythm  Breathing unlabored  Abdomen benign  Extremities warm and well perfused    Sedation Exam:  ASA: III - Patient appears to have severe systemic disease not posing a constant threat to life   Mallampati: II (hard and soft palate, upper portion of tonsils anduvula visible)     Laboratory:  No results found for: INR    Lab Results   Component Value Date    WBC 14.19 (H) 03/25/2022    HGB 7.5 (L) 03/25/2022    HCT 24.1 (L) 03/25/2022    MCV 88 03/25/2022     03/25/2022      Lab Results   Component Value Date     03/25/2022     03/25/2022    K 5.6 (H) 03/25/2022     03/25/2022    CO2 28 03/25/2022    BUN 31 (H) 03/25/2022    CREATININE 5.7 (H) 03/25/2022    CALCIUM 9.0 03/25/2022    MG 2.0 03/25/2022    ALT 14 03/14/2022    AST 16 03/14/2022    ALBUMIN 3.0 (L) 03/14/2022    BILITOT 0.3 03/14/2022       Imaging:  Reviewed.      ASSESSMENT/PLAN:   Pt is 77yoM w/ ESRD s/p L nephrectomy for renal mass. IR consulted for permacath placement for HD.   Procedure planned for today. Keep pt NPO.                       Sedation Plan: moderate     Patient will undergo: permacath placement      Airam Luis MD  R2 Interventional/Diagnostic Radiology

## 2022-03-25 NOTE — PROGRESS NOTES
[facility]  Urology  Progress Note    Patient Name: Kurtis Plata  MRN: [id number]  Admission Date: 3/24/2022  Hospital Length of Stay: 1 days  Code Status: Prior   Attending Provider: Janes Ochoa MD   Primary Care Physician: Anmol oJe MD    Subjective:     HPI:  No notes on file    Interval History: NAEON. AFVSS. Slight tachycardia overnight. HD yesterday via trialysis. Ambulated in room. Pain controlled. Tolerated diet last night. NPO since midnight.      Objective:     Temp:  [97.2 °F (36.2 °C)-99 °F (37.2 °C)] 99 °F (37.2 °C)  Pulse:  [] 102  Resp:  [13-19] 16  SpO2:  [96 %-100 %] 96 %  BP: (119-173)/(68-99) 151/70     Body mass index is 28.46 kg/m².           Drains       Drain  Duration                  Hemodialysis Catheter 03/24/22 0908 <1 day                    Physical Exam  Constitutional:       General: He is not in acute distress.     Appearance: He is not diaphoretic.   HENT:      Head: Normocephalic and atraumatic.      Nose: Nose normal.   Eyes:      Conjunctiva/sclera: Conjunctivae normal.   Cardiovascular:      Rate and Rhythm: Normal rate.   Pulmonary:      Effort: Pulmonary effort is normal. No respiratory distress.   Abdominal:      General: There is no distension.      Tenderness: There is no abdominal tenderness.      Comments: Lap incisions c/d/i   Musculoskeletal:         General: Normal range of motion.      Cervical back: Normal range of motion.   Skin:     General: Skin is dry.   Neurological:      Mental Status: He is alert.   Psychiatric:         Behavior: Behavior normal.         Thought Content: Thought content normal.       Significant Labs:    BMP:  Recent Labs   Lab 03/24/22  1305 03/25/22  0609    137   K 5.3* 5.6*    100   CO2 21* 28   BUN 46* 31*   CREATININE 7.9* 5.7*   CALCIUM 8.2* 9.0       CBC:   Recent Labs   Lab 03/24/22  1305 03/24/22  1443   WBC 12.98* 13.15*   HGB 6.7* 7.2*   HCT 22.4* 23.3*    210       All pertinent labs  results from the past 24 hours have been reviewed.    Significant Imaging:  All pertinent imaging results/findings from the past 24 hours have been reviewed.                    Assessment/Plan:     * Renal mass, left  Kurtis Plata is an 77 y.o. male that is s/p Left robotic retroperitoneal radical nephrectomy on 3/24/22. Doing well post-op.    - Pain - controlled  - Diet - tolerated yesterday. NPO since midnight   - OOB  - Encourage IS  - DVT ppx   - F/u labs  - Will discuss with IR about permacath today    - Dispo: Plan for possible permacath today. Plan for discharge tomorrow          VTE Risk Mitigation (From admission, onward)         Ordered     heparin (porcine) injection 5,000 Units  Every 8 hours         03/24/22 9130                Isabell Kang MD  Urology  [facility] - Surgery

## 2022-03-26 LAB
ANION GAP SERPL CALC-SCNC: 6 MMOL/L (ref 8–16)
ANION GAP SERPL CALC-SCNC: 6 MMOL/L (ref 8–16)
ANION GAP SERPL CALC-SCNC: 7 MMOL/L (ref 8–16)
ANION GAP SERPL CALC-SCNC: 7 MMOL/L (ref 8–16)
ANION GAP SERPL CALC-SCNC: 9 MMOL/L (ref 8–16)
BASOPHILS # BLD AUTO: 0.01 K/UL (ref 0–0.2)
BASOPHILS # BLD AUTO: 0.01 K/UL (ref 0–0.2)
BASOPHILS NFR BLD: 0.1 % (ref 0–1.9)
BASOPHILS NFR BLD: 0.1 % (ref 0–1.9)
BUN SERPL-MCNC: 11 MG/DL (ref 8–23)
BUN SERPL-MCNC: 11 MG/DL (ref 8–23)
BUN SERPL-MCNC: 14 MG/DL (ref 8–23)
BUN SERPL-MCNC: 14 MG/DL (ref 8–23)
BUN SERPL-MCNC: 18 MG/DL (ref 8–23)
CALCIUM SERPL-MCNC: 9.2 MG/DL (ref 8.7–10.5)
CALCIUM SERPL-MCNC: 9.2 MG/DL (ref 8.7–10.5)
CALCIUM SERPL-MCNC: 9.5 MG/DL (ref 8.7–10.5)
CHLORIDE SERPL-SCNC: 101 MMOL/L (ref 95–110)
CHLORIDE SERPL-SCNC: 104 MMOL/L (ref 95–110)
CHLORIDE SERPL-SCNC: 104 MMOL/L (ref 95–110)
CHLORIDE SERPL-SCNC: 105 MMOL/L (ref 95–110)
CHLORIDE SERPL-SCNC: 105 MMOL/L (ref 95–110)
CO2 SERPL-SCNC: 24 MMOL/L (ref 23–29)
CREAT SERPL-MCNC: 2.7 MG/DL (ref 0.5–1.4)
CREAT SERPL-MCNC: 2.7 MG/DL (ref 0.5–1.4)
CREAT SERPL-MCNC: 3.9 MG/DL (ref 0.5–1.4)
CREAT SERPL-MCNC: 3.9 MG/DL (ref 0.5–1.4)
CREAT SERPL-MCNC: 4.9 MG/DL (ref 0.5–1.4)
DIFFERENTIAL METHOD: ABNORMAL
DIFFERENTIAL METHOD: ABNORMAL
EOSINOPHIL # BLD AUTO: 0.1 K/UL (ref 0–0.5)
EOSINOPHIL # BLD AUTO: 0.1 K/UL (ref 0–0.5)
EOSINOPHIL NFR BLD: 1.1 % (ref 0–8)
EOSINOPHIL NFR BLD: 1.3 % (ref 0–8)
ERYTHROCYTE [DISTWIDTH] IN BLOOD BY AUTOMATED COUNT: 16 % (ref 11.5–14.5)
ERYTHROCYTE [DISTWIDTH] IN BLOOD BY AUTOMATED COUNT: 16.5 % (ref 11.5–14.5)
EST. GFR  (AFRICAN AMERICAN): 12.1 ML/MIN/1.73 M^2
EST. GFR  (AFRICAN AMERICAN): 16 ML/MIN/1.73 M^2
EST. GFR  (AFRICAN AMERICAN): 16 ML/MIN/1.73 M^2
EST. GFR  (AFRICAN AMERICAN): 25 ML/MIN/1.73 M^2
EST. GFR  (AFRICAN AMERICAN): 25 ML/MIN/1.73 M^2
EST. GFR  (NON AFRICAN AMERICAN): 10.5 ML/MIN/1.73 M^2
EST. GFR  (NON AFRICAN AMERICAN): 13.8 ML/MIN/1.73 M^2
EST. GFR  (NON AFRICAN AMERICAN): 13.8 ML/MIN/1.73 M^2
EST. GFR  (NON AFRICAN AMERICAN): 21.6 ML/MIN/1.73 M^2
EST. GFR  (NON AFRICAN AMERICAN): 21.6 ML/MIN/1.73 M^2
GLUCOSE SERPL-MCNC: 89 MG/DL (ref 70–110)
GLUCOSE SERPL-MCNC: 89 MG/DL (ref 70–110)
GLUCOSE SERPL-MCNC: 92 MG/DL (ref 70–110)
GLUCOSE SERPL-MCNC: 92 MG/DL (ref 70–110)
GLUCOSE SERPL-MCNC: 95 MG/DL (ref 70–110)
HCT VFR BLD AUTO: 21.1 % (ref 40–54)
HCT VFR BLD AUTO: 22.9 % (ref 40–54)
HGB BLD-MCNC: 6.5 G/DL (ref 14–18)
HGB BLD-MCNC: 7.4 G/DL (ref 14–18)
IMM GRANULOCYTES # BLD AUTO: 0.04 K/UL (ref 0–0.04)
IMM GRANULOCYTES # BLD AUTO: 0.06 K/UL (ref 0–0.04)
IMM GRANULOCYTES NFR BLD AUTO: 0.4 % (ref 0–0.5)
IMM GRANULOCYTES NFR BLD AUTO: 0.7 % (ref 0–0.5)
LYMPHOCYTES # BLD AUTO: 0.6 K/UL (ref 1–4.8)
LYMPHOCYTES # BLD AUTO: 0.7 K/UL (ref 1–4.8)
LYMPHOCYTES NFR BLD: 6.2 % (ref 18–48)
LYMPHOCYTES NFR BLD: 8.1 % (ref 18–48)
MAGNESIUM SERPL-MCNC: 2 MG/DL (ref 1.6–2.6)
MCH RBC QN AUTO: 28 PG (ref 27–31)
MCH RBC QN AUTO: 28.6 PG (ref 27–31)
MCHC RBC AUTO-ENTMCNC: 30.8 G/DL (ref 32–36)
MCHC RBC AUTO-ENTMCNC: 32.3 G/DL (ref 32–36)
MCV RBC AUTO: 88 FL (ref 82–98)
MCV RBC AUTO: 91 FL (ref 82–98)
MONOCYTES # BLD AUTO: 0.5 K/UL (ref 0.3–1)
MONOCYTES # BLD AUTO: 0.8 K/UL (ref 0.3–1)
MONOCYTES NFR BLD: 5.6 % (ref 4–15)
MONOCYTES NFR BLD: 8.5 % (ref 4–15)
NEUTROPHILS # BLD AUTO: 7.4 K/UL (ref 1.8–7.7)
NEUTROPHILS # BLD AUTO: 7.8 K/UL (ref 1.8–7.7)
NEUTROPHILS NFR BLD: 81.8 % (ref 38–73)
NEUTROPHILS NFR BLD: 86.1 % (ref 38–73)
NRBC BLD-RTO: 0 /100 WBC
NRBC BLD-RTO: 0 /100 WBC
PHOSPHATE SERPL-MCNC: 3.9 MG/DL (ref 2.7–4.5)
PLATELET # BLD AUTO: 163 K/UL (ref 150–450)
PLATELET # BLD AUTO: 170 K/UL (ref 150–450)
PMV BLD AUTO: 11 FL (ref 9.2–12.9)
PMV BLD AUTO: 11 FL (ref 9.2–12.9)
POCT GLUCOSE: 100 MG/DL (ref 70–110)
POCT GLUCOSE: 119 MG/DL (ref 70–110)
POCT GLUCOSE: 98 MG/DL (ref 70–110)
POCT GLUCOSE: 98 MG/DL (ref 70–110)
POTASSIUM SERPL-SCNC: 3.9 MMOL/L (ref 3.5–5.1)
POTASSIUM SERPL-SCNC: 4.6 MMOL/L (ref 3.5–5.1)
POTASSIUM SERPL-SCNC: 4.7 MMOL/L (ref 3.5–5.1)
POTASSIUM SERPL-SCNC: 4.7 MMOL/L (ref 3.5–5.1)
RBC # BLD AUTO: 2.32 M/UL (ref 4.6–6.2)
RBC # BLD AUTO: 2.59 M/UL (ref 4.6–6.2)
SODIUM SERPL-SCNC: 134 MMOL/L (ref 136–145)
SODIUM SERPL-SCNC: 136 MMOL/L (ref 136–145)
SODIUM SERPL-SCNC: 136 MMOL/L (ref 136–145)
WBC # BLD AUTO: 9.01 K/UL (ref 3.9–12.7)
WBC # BLD AUTO: 9.03 K/UL (ref 3.9–12.7)

## 2022-03-26 PROCEDURE — 85025 COMPLETE CBC W/AUTO DIFF WBC: CPT | Performed by: STUDENT IN AN ORGANIZED HEALTH CARE EDUCATION/TRAINING PROGRAM

## 2022-03-26 PROCEDURE — 93010 EKG 12-LEAD: ICD-10-PCS | Mod: ,,, | Performed by: INTERNAL MEDICINE

## 2022-03-26 PROCEDURE — P9021 RED BLOOD CELLS UNIT: HCPCS | Performed by: STUDENT IN AN ORGANIZED HEALTH CARE EDUCATION/TRAINING PROGRAM

## 2022-03-26 PROCEDURE — 80048 BASIC METABOLIC PNL TOTAL CA: CPT | Mod: 91 | Performed by: STUDENT IN AN ORGANIZED HEALTH CARE EDUCATION/TRAINING PROGRAM

## 2022-03-26 PROCEDURE — 94761 N-INVAS EAR/PLS OXIMETRY MLT: CPT

## 2022-03-26 PROCEDURE — 80048 BASIC METABOLIC PNL TOTAL CA: CPT | Performed by: STUDENT IN AN ORGANIZED HEALTH CARE EDUCATION/TRAINING PROGRAM

## 2022-03-26 PROCEDURE — 85025 COMPLETE CBC W/AUTO DIFF WBC: CPT | Mod: 91 | Performed by: UROLOGY

## 2022-03-26 PROCEDURE — 25000003 PHARM REV CODE 250: Performed by: STUDENT IN AN ORGANIZED HEALTH CARE EDUCATION/TRAINING PROGRAM

## 2022-03-26 PROCEDURE — 83735 ASSAY OF MAGNESIUM: CPT | Performed by: STUDENT IN AN ORGANIZED HEALTH CARE EDUCATION/TRAINING PROGRAM

## 2022-03-26 PROCEDURE — 25000003 PHARM REV CODE 250: Performed by: UROLOGY

## 2022-03-26 PROCEDURE — 11000001 HC ACUTE MED/SURG PRIVATE ROOM

## 2022-03-26 PROCEDURE — 63600175 PHARM REV CODE 636 W HCPCS: Performed by: STUDENT IN AN ORGANIZED HEALTH CARE EDUCATION/TRAINING PROGRAM

## 2022-03-26 PROCEDURE — 84100 ASSAY OF PHOSPHORUS: CPT | Performed by: STUDENT IN AN ORGANIZED HEALTH CARE EDUCATION/TRAINING PROGRAM

## 2022-03-26 PROCEDURE — 36415 COLL VENOUS BLD VENIPUNCTURE: CPT | Performed by: UROLOGY

## 2022-03-26 PROCEDURE — 93010 ELECTROCARDIOGRAM REPORT: CPT | Mod: ,,, | Performed by: INTERNAL MEDICINE

## 2022-03-26 PROCEDURE — 93005 ELECTROCARDIOGRAM TRACING: CPT

## 2022-03-26 PROCEDURE — 36430 TRANSFUSION BLD/BLD COMPNT: CPT

## 2022-03-26 RX ORDER — SODIUM CHLORIDE 9 MG/ML
INJECTION, SOLUTION INTRAVENOUS ONCE
Status: DISCONTINUED | OUTPATIENT
Start: 2022-03-28 | End: 2022-03-31 | Stop reason: HOSPADM

## 2022-03-26 RX ORDER — HYDROCODONE BITARTRATE AND ACETAMINOPHEN 500; 5 MG/1; MG/1
TABLET ORAL
Status: DISCONTINUED | OUTPATIENT
Start: 2022-03-26 | End: 2022-03-31 | Stop reason: HOSPADM

## 2022-03-26 RX ADMIN — ALLOPURINOL 150 MG: 300 TABLET ORAL at 09:03

## 2022-03-26 RX ADMIN — DOXAZOSIN 8 MG: 8 TABLET ORAL at 09:03

## 2022-03-26 RX ADMIN — METOCLOPRAMIDE 5 MG: 5 TABLET ORAL at 12:03

## 2022-03-26 RX ADMIN — METOCLOPRAMIDE 5 MG: 5 TABLET ORAL at 09:03

## 2022-03-26 RX ADMIN — HEPARIN SODIUM 5000 UNITS: 5000 INJECTION INTRAVENOUS; SUBCUTANEOUS at 08:03

## 2022-03-26 RX ADMIN — CYANOCOBALAMIN TAB 250 MCG 500 MCG: 250 TAB at 12:03

## 2022-03-26 RX ADMIN — ACETAMINOPHEN 1000 MG: 500 TABLET ORAL at 12:03

## 2022-03-26 RX ADMIN — CYANOCOBALAMIN TAB 250 MCG 500 MCG: 250 TAB at 09:03

## 2022-03-26 RX ADMIN — LOSARTAN POTASSIUM 50 MG: 50 TABLET, FILM COATED ORAL at 09:03

## 2022-03-26 RX ADMIN — ATORVASTATIN CALCIUM 20 MG: 20 TABLET, FILM COATED ORAL at 12:03

## 2022-03-26 RX ADMIN — HEPARIN SODIUM 5000 UNITS: 5000 INJECTION INTRAVENOUS; SUBCUTANEOUS at 09:03

## 2022-03-26 RX ADMIN — HEPARIN SODIUM 5000 UNITS: 5000 INJECTION INTRAVENOUS; SUBCUTANEOUS at 12:03

## 2022-03-26 RX ADMIN — PANTOPRAZOLE SODIUM 40 MG: 40 TABLET, DELAYED RELEASE ORAL at 09:03

## 2022-03-26 RX ADMIN — HEPARIN SODIUM 5000 UNITS: 5000 INJECTION INTRAVENOUS; SUBCUTANEOUS at 06:03

## 2022-03-26 RX ADMIN — HYDROCHLOROTHIAZIDE 25 MG: 25 TABLET ORAL at 09:03

## 2022-03-26 RX ADMIN — MUPIROCIN: 20 OINTMENT TOPICAL at 09:03

## 2022-03-26 RX ADMIN — CINACALCET 30 MG: 30 TABLET, FILM COATED ORAL at 09:03

## 2022-03-26 RX ADMIN — ACETAMINOPHEN 1000 MG: 500 TABLET ORAL at 06:03

## 2022-03-26 RX ADMIN — AMLODIPINE BESYLATE 10 MG: 10 TABLET ORAL at 09:03

## 2022-03-26 RX ADMIN — ATORVASTATIN CALCIUM 20 MG: 20 TABLET, FILM COATED ORAL at 09:03

## 2022-03-26 RX ADMIN — CALCIUM CARBONATE (ANTACID) CHEW TAB 500 MG 500 MG: 500 CHEW TAB at 12:03

## 2022-03-26 RX ADMIN — DOXAZOSIN 8 MG: 8 TABLET ORAL at 12:03

## 2022-03-26 NOTE — PROGRESS NOTES
03/25/22 2045   During Hemodialysis Assessment   Blood Flow Rate (mL/min) 400 mL/min   Dialysate Flow Rate (mL/min) 800 ml/min   Ultrafiltration Rate (mL/Hr) 570 mL/Hr   Arteriovenous Lines Secure Yes   Arterial Pressure (mmHg) -120 mmHg   Venous Pressure (mmHg) 90   Blood Volume Processed (Liters) 0 L   UF Removed (mL) 0 mL   TMP 50   Venous Line in Air Detector Yes   Intake (mL) 250 mL   Intra-Hemodialysis Comments HD initiated   Report received from primary RN. HD started via RIJ trialysis catheter.

## 2022-03-26 NOTE — ASSESSMENT & PLAN NOTE
Kurtis Plata is an 77 y.o. male with a history of ESRD and a left renal mass who underwent left robotic retroperitoneal radical nephrectomy on 3/24/22.    - Trend H/H - 1 U PRBC this AM  - EKG 3/26 w/ sinus tachycardia, suspect related to anemia  - PT/OT  - Ambulate qid  - HD per nephrology, appreciate recs  - Drains: Trialysis and permacath - will talk to nephrology re: line management.  - Renal diet  - Home meds  - Pain control  - PPx: SCD's/MARYBEL's/IS/SQH    Dispo: remain hospitalized

## 2022-03-26 NOTE — PROGRESS NOTES
[facility]  Urology  Progress Note    Patient Name: Kurtis Plata  MRN: [id number]  Admission Date: 3/24/2022  Hospital Length of Stay: 2 days  Code Status: Prior   Attending Provider: Janes Ochoa MD   Primary Care Physician: Anmol Joe MD    Subjective:     HPI:  Kurtis Plata is an 77 y.o. male with a history of ESRD and a left renal mass who underwent left robotic retroperitoneal radical nephrectomy on 3/24/22.      Interval History: Tachyardic to 110-120's overnight. EKG w/ sinus tachycardia. Hgb 6.5 from 7.5. Received permacath with IR yesterday. Dialyzed via trialysis overnight. Ambulating, pain controlled, and tolerating PO.      Objective:     Temp:  [97.9 °F (36.6 °C)-99.2 °F (37.3 °C)] 97.9 °F (36.6 °C)  Pulse:  [] 120  Resp:  [15-18] 18  SpO2:  [94 %-100 %] 100 %  BP: ()/(55-77) 109/58     Body mass index is 28.46 kg/m².           Drains       Drain  Duration                  Hemodialysis Catheter 03/24/22 0908 1 day         Hemodialysis Catheter 03/25/22 1447 right internal jugular <1 day                    Physical Exam  Vitals and nursing note reviewed.   Constitutional:       Appearance: Normal appearance.   HENT:      Head: Normocephalic and atraumatic.      Nose: Nose normal.      Mouth/Throat:      Mouth: Mucous membranes are moist.   Eyes:      General: No scleral icterus.     Pupils: Pupils are equal, round, and reactive to light.   Cardiovascular:      Rate and Rhythm: Tachycardia present.      Comments: Trialysis (R neck) and permacath (R chest) in place  Pulmonary:      Effort: Pulmonary effort is normal. No respiratory distress.   Abdominal:      General: Abdomen is flat. There is no distension.      Tenderness: There is no abdominal tenderness.      Comments: Incisions clean/dry/intact    PD catheter in place   Skin:     General: Skin is warm and dry.   Neurological:      General: No focal deficit present.      Mental Status: He is alert and oriented to  person, place, and time.   Psychiatric:         Mood and Affect: Mood normal.         Behavior: Behavior normal.         Thought Content: Thought content normal.         Judgment: Judgment normal.       Significant Labs:    BMP:  Recent Labs   Lab 03/25/22  2019 03/26/22  0052 03/26/22  0630     139 136  136 134*  134*   K 5.1  5.1  5.1  5.1 3.9  3.9  3.9  3.9 4.7  4.7     101 105  105 104  104   CO2 26 26 24 24 24  24   BUN 37*  37* 11  11 14  14   CREATININE 6.8*  6.8* 2.7*  2.7* 3.9*  3.9*   CALCIUM 9.4  9.4 9.2  9.2 9.5  9.5       CBC:   Recent Labs   Lab 03/24/22  1443 03/25/22  0609 03/26/22  0630   WBC 13.15* 14.19* 9.01   HGB 7.2* 7.5* 6.5*   HCT 23.3* 24.1* 21.1*    218 170       All pertinent labs results from the past 24 hours have been reviewed.    Significant Imaging:  All pertinent imaging results/findings from the past 24 hours have been reviewed.        Assessment/Plan:     * Renal mass, left  Kurtis Plata is an 77 y.o. male with a history of ESRD and a left renal mass who underwent left robotic retroperitoneal radical nephrectomy on 3/24/22.    - Trend H/H - 1 U PRBC this AM  - EKG 3/26 w/ sinus tachycardia, suspect related to anemia  - PT/OT  - Ambulate qid  - HD per nephrology, appreciate recs  - Drains: Trialysis and permacath - will talk to nephrology re: line management.  - Renal diet  - Home meds  - Pain control  - PPx: SCD's/MARYBEL's/IS/SQH    Dispo: remain hospitalized        VTE Risk Mitigation (From admission, onward)         Ordered     heparin (porcine) injection 5,000 Units  Every 8 hours         03/24/22 3086                Aroldo Yeboah MD  Urology  [facility] - Surgery

## 2022-03-26 NOTE — PROGRESS NOTES
03/26/22 0015   During Hemodialysis Assessment   Blood Flow Rate (mL/min) 400 mL/min   Dialysate Flow Rate (mL/min) 800 ml/min   Ultrafiltration Rate (mL/Hr) 0 mL/Hr   Arteriovenous Lines Secure Yes   Arterial Pressure (mmHg) -150 mmHg   Venous Pressure (mmHg) 100   Blood Volume Processed (Liters) 67.6 L   UF Removed (mL) 750 mL   TMP 50   Venous Line in Air Detector Yes   Intake (mL) 250 mL   Intra-Hemodialysis Comments HD completed   HD completed per MD order. Report given to primary RN.

## 2022-03-26 NOTE — HPI
Kurtis Plata is an 77 y.o. male with a history of ESRD and a left renal mass who underwent left robotic retroperitoneal radical nephrectomy on 3/24/22.

## 2022-03-26 NOTE — SUBJECTIVE & OBJECTIVE
Interval History: Tachyardic to 110-120's overnight. EKG w/ sinus tachycardia. Hgb 6.5 from 7.5. Received permacath with IR yesterday. Dialyzed via trialysis overnight. Ambulating, pain controlled, and tolerating PO.      Objective:     Temp:  [97.9 °F (36.6 °C)-99.2 °F (37.3 °C)] 97.9 °F (36.6 °C)  Pulse:  [] 120  Resp:  [15-18] 18  SpO2:  [94 %-100 %] 100 %  BP: ()/(55-77) 109/58     Body mass index is 28.46 kg/m².           Drains       Drain  Duration                  Hemodialysis Catheter 03/24/22 0908 1 day         Hemodialysis Catheter 03/25/22 1447 right internal jugular <1 day                    Physical Exam  Vitals and nursing note reviewed.   Constitutional:       Appearance: Normal appearance.   HENT:      Head: Normocephalic and atraumatic.      Nose: Nose normal.      Mouth/Throat:      Mouth: Mucous membranes are moist.   Eyes:      General: No scleral icterus.     Pupils: Pupils are equal, round, and reactive to light.   Cardiovascular:      Rate and Rhythm: Tachycardia present.      Comments: Trialysis (R neck) and permacath (R chest) in place  Pulmonary:      Effort: Pulmonary effort is normal. No respiratory distress.   Abdominal:      General: Abdomen is flat. There is no distension.      Tenderness: There is no abdominal tenderness.      Comments: Incisions clean/dry/intact    PD catheter in place   Skin:     General: Skin is warm and dry.   Neurological:      General: No focal deficit present.      Mental Status: He is alert and oriented to person, place, and time.   Psychiatric:         Mood and Affect: Mood normal.         Behavior: Behavior normal.         Thought Content: Thought content normal.         Judgment: Judgment normal.       Significant Labs:    BMP:  Recent Labs   Lab 03/25/22  2019 03/26/22  0052 03/26/22  0630     139 136  136 134*  134*   K 5.1  5.1  5.1  5.1 3.9  3.9  3.9  3.9 4.7  4.7     101 105  105 104  104   CO2 26  26 24  24 24   24   BUN 37*  37* 11  11 14  14   CREATININE 6.8*  6.8* 2.7*  2.7* 3.9*  3.9*   CALCIUM 9.4  9.4 9.2  9.2 9.5  9.5       CBC:   Recent Labs   Lab 03/24/22  1443 03/25/22  0609 03/26/22  0630   WBC 13.15* 14.19* 9.01   HGB 7.2* 7.5* 6.5*   HCT 23.3* 24.1* 21.1*    218 170       All pertinent labs results from the past 24 hours have been reviewed.    Significant Imaging:  All pertinent imaging results/findings from the past 24 hours have been reviewed.

## 2022-03-27 LAB
ANION GAP SERPL CALC-SCNC: 10 MMOL/L (ref 8–16)
BASOPHILS # BLD AUTO: 0.01 K/UL (ref 0–0.2)
BASOPHILS NFR BLD: 0.1 % (ref 0–1.9)
BUN SERPL-MCNC: 26 MG/DL (ref 8–23)
CALCIUM SERPL-MCNC: 9.9 MG/DL (ref 8.7–10.5)
CHLORIDE SERPL-SCNC: 101 MMOL/L (ref 95–110)
CO2 SERPL-SCNC: 23 MMOL/L (ref 23–29)
CREAT SERPL-MCNC: 6.5 MG/DL (ref 0.5–1.4)
DIFFERENTIAL METHOD: ABNORMAL
EOSINOPHIL # BLD AUTO: 0.2 K/UL (ref 0–0.5)
EOSINOPHIL NFR BLD: 2 % (ref 0–8)
ERYTHROCYTE [DISTWIDTH] IN BLOOD BY AUTOMATED COUNT: 16.4 % (ref 11.5–14.5)
EST. GFR  (AFRICAN AMERICAN): 8.6 ML/MIN/1.73 M^2
EST. GFR  (NON AFRICAN AMERICAN): 7.5 ML/MIN/1.73 M^2
GLUCOSE SERPL-MCNC: 88 MG/DL (ref 70–110)
HCT VFR BLD AUTO: 23.6 % (ref 40–54)
HGB BLD-MCNC: 7.6 G/DL (ref 14–18)
IMM GRANULOCYTES # BLD AUTO: 0.02 K/UL (ref 0–0.04)
IMM GRANULOCYTES NFR BLD AUTO: 0.2 % (ref 0–0.5)
LYMPHOCYTES # BLD AUTO: 0.8 K/UL (ref 1–4.8)
LYMPHOCYTES NFR BLD: 9.6 % (ref 18–48)
MAGNESIUM SERPL-MCNC: 2.1 MG/DL (ref 1.6–2.6)
MCH RBC QN AUTO: 28.7 PG (ref 27–31)
MCHC RBC AUTO-ENTMCNC: 32.2 G/DL (ref 32–36)
MCV RBC AUTO: 89 FL (ref 82–98)
MONOCYTES # BLD AUTO: 0.7 K/UL (ref 0.3–1)
MONOCYTES NFR BLD: 8.2 % (ref 4–15)
NEUTROPHILS # BLD AUTO: 6.7 K/UL (ref 1.8–7.7)
NEUTROPHILS NFR BLD: 79.9 % (ref 38–73)
NRBC BLD-RTO: 0 /100 WBC
PHOSPHATE SERPL-MCNC: 4.8 MG/DL (ref 2.7–4.5)
PLATELET # BLD AUTO: 176 K/UL (ref 150–450)
PMV BLD AUTO: 11.1 FL (ref 9.2–12.9)
POCT GLUCOSE: 99 MG/DL (ref 70–110)
POTASSIUM SERPL-SCNC: 4.9 MMOL/L (ref 3.5–5.1)
RBC # BLD AUTO: 2.65 M/UL (ref 4.6–6.2)
SODIUM SERPL-SCNC: 134 MMOL/L (ref 136–145)
WBC # BLD AUTO: 8.34 K/UL (ref 3.9–12.7)

## 2022-03-27 PROCEDURE — 25000003 PHARM REV CODE 250: Performed by: UROLOGY

## 2022-03-27 PROCEDURE — 25000003 PHARM REV CODE 250: Performed by: STUDENT IN AN ORGANIZED HEALTH CARE EDUCATION/TRAINING PROGRAM

## 2022-03-27 PROCEDURE — 97535 SELF CARE MNGMENT TRAINING: CPT

## 2022-03-27 PROCEDURE — 80048 BASIC METABOLIC PNL TOTAL CA: CPT | Performed by: UROLOGY

## 2022-03-27 PROCEDURE — 36415 COLL VENOUS BLD VENIPUNCTURE: CPT | Performed by: UROLOGY

## 2022-03-27 PROCEDURE — 11000001 HC ACUTE MED/SURG PRIVATE ROOM

## 2022-03-27 PROCEDURE — C9113 INJ PANTOPRAZOLE SODIUM, VIA: HCPCS | Performed by: STUDENT IN AN ORGANIZED HEALTH CARE EDUCATION/TRAINING PROGRAM

## 2022-03-27 PROCEDURE — 97165 OT EVAL LOW COMPLEX 30 MIN: CPT

## 2022-03-27 PROCEDURE — 43752 NASAL/OROGASTRIC W/TUBE PLMT: CPT

## 2022-03-27 PROCEDURE — 84100 ASSAY OF PHOSPHORUS: CPT | Performed by: UROLOGY

## 2022-03-27 PROCEDURE — 83735 ASSAY OF MAGNESIUM: CPT | Performed by: UROLOGY

## 2022-03-27 PROCEDURE — 85025 COMPLETE CBC W/AUTO DIFF WBC: CPT | Performed by: UROLOGY

## 2022-03-27 PROCEDURE — 63600175 PHARM REV CODE 636 W HCPCS: Performed by: STUDENT IN AN ORGANIZED HEALTH CARE EDUCATION/TRAINING PROGRAM

## 2022-03-27 PROCEDURE — 94761 N-INVAS EAR/PLS OXIMETRY MLT: CPT

## 2022-03-27 RX ORDER — LABETALOL HYDROCHLORIDE 5 MG/ML
10 INJECTION, SOLUTION INTRAVENOUS EVERY 4 HOURS PRN
Status: DISCONTINUED | OUTPATIENT
Start: 2022-03-27 | End: 2022-03-31 | Stop reason: HOSPADM

## 2022-03-27 RX ORDER — BISACODYL 10 MG
10 SUPPOSITORY, RECTAL RECTAL ONCE
Status: COMPLETED | OUTPATIENT
Start: 2022-03-27 | End: 2022-03-27

## 2022-03-27 RX ORDER — PANTOPRAZOLE SODIUM 40 MG/10ML
40 INJECTION, POWDER, LYOPHILIZED, FOR SOLUTION INTRAVENOUS DAILY
Status: DISCONTINUED | OUTPATIENT
Start: 2022-03-27 | End: 2022-03-30

## 2022-03-27 RX ORDER — SODIUM CHLORIDE 9 MG/ML
INJECTION, SOLUTION INTRAVENOUS ONCE
Status: DISCONTINUED | OUTPATIENT
Start: 2022-03-28 | End: 2022-03-31 | Stop reason: HOSPADM

## 2022-03-27 RX ADMIN — HEPARIN SODIUM 5000 UNITS: 5000 INJECTION INTRAVENOUS; SUBCUTANEOUS at 02:03

## 2022-03-27 RX ADMIN — MUPIROCIN: 20 OINTMENT TOPICAL at 09:03

## 2022-03-27 RX ADMIN — CALCIUM CARBONATE (ANTACID) CHEW TAB 500 MG 500 MG: 500 CHEW TAB at 09:03

## 2022-03-27 RX ADMIN — PANTOPRAZOLE SODIUM 40 MG: 40 INJECTION, POWDER, FOR SOLUTION INTRAVENOUS at 09:03

## 2022-03-27 RX ADMIN — CALCIUM CARBONATE (ANTACID) CHEW TAB 500 MG 500 MG: 500 CHEW TAB at 01:03

## 2022-03-27 RX ADMIN — HEPARIN SODIUM 5000 UNITS: 5000 INJECTION INTRAVENOUS; SUBCUTANEOUS at 06:03

## 2022-03-27 RX ADMIN — HEPARIN SODIUM 5000 UNITS: 5000 INJECTION INTRAVENOUS; SUBCUTANEOUS at 09:03

## 2022-03-27 RX ADMIN — BISACODYL 10 MG: 10 SUPPOSITORY RECTAL at 09:03

## 2022-03-27 RX ADMIN — SODIUM CHLORIDE 250 ML: 0.9 INJECTION, SOLUTION INTRAVENOUS at 09:03

## 2022-03-27 NOTE — NURSING
Nurse Kevin Hough RN using aspetic technique inserted NG 16 FR in left nare tolerated well small amount of green bile noted .. noted right nare obstruction . X-ray called

## 2022-03-27 NOTE — SUBJECTIVE & OBJECTIVE
Interval History: Had an episode of large volume bilious emesis this AM. Endorsing reflux symptoms now without nausea. KUB w/ dilation of bowel w/o large gastric bubble per my read, radiology read pending. Denies passage of flatus or BM's. Hgb improved to 7.6 from 6.5 s/p 1 U PRBC yesterday. Ambulated and pain controlled. Trialysis removed per nephrology recs, permacath remains in place. Afebrile, tachycardia improving.    Objective:     Temp:  [97.2 °F (36.2 °C)-99.4 °F (37.4 °C)] 98.6 °F (37 °C)  Pulse:  [] 97  Resp:  [18] 18  SpO2:  [95 %-100 %] 95 %  BP: (109-162)/(57-73) 162/73     Body mass index is 28.46 kg/m².           Drains       Drain  Duration                  Hemodialysis Catheter 03/24/22 0908 1 day         Hemodialysis Catheter 03/25/22 1447 right internal jugular <1 day                    Physical Exam  Vitals and nursing note reviewed.   Constitutional:       Appearance: Normal appearance.   HENT:      Head: Normocephalic and atraumatic.      Nose: Nose normal.      Mouth/Throat:      Mouth: Mucous membranes are moist.   Eyes:      General: No scleral icterus.     Pupils: Pupils are equal, round, and reactive to light.   Cardiovascular:      Rate and Rhythm: Normal rate.      Comments: Permacath (R chest) in place. Dressing over right neck (former trialysis site).  Pulmonary:      Effort: Pulmonary effort is normal. No respiratory distress.   Abdominal:      General: Abdomen is flat. There is distension.      Tenderness: There is no abdominal tenderness.      Comments: Incisions clean/dry/intact  Abdomen distended and tympanitic, not peritonitic or tender    PD catheter in place, no erythema, fluctuance, or induration surrounding insertion site   Skin:     General: Skin is warm and dry.   Neurological:      General: No focal deficit present.      Mental Status: He is alert and oriented to person, place, and time.   Psychiatric:         Mood and Affect: Mood normal.         Behavior: Behavior  normal.         Thought Content: Thought content normal.         Judgment: Judgment normal.       Significant Labs:    BMP:  Recent Labs   Lab 03/26/22  0630 03/26/22  1457 03/27/22  0615   *  134* 134* 134*   K 4.7  4.7 4.6 4.9     104 101 101   CO2 24  24 24 23   BUN 14  14 18 26*   CREATININE 3.9*  3.9* 4.9* 6.5*   CALCIUM 9.5  9.5 9.5 9.9         CBC:   Recent Labs   Lab 03/26/22  0630 03/26/22  1629 03/27/22  0615   WBC 9.01 9.03 8.34   HGB 6.5* 7.4* 7.6*   HCT 21.1* 22.9* 23.6*    163 176         All pertinent labs results from the past 24 hours have been reviewed.    Significant Imaging:  All pertinent imaging results/findings from the past 24 hours have been reviewed.

## 2022-03-27 NOTE — ASSESSMENT & PLAN NOTE
Kurtis Plata is an 77 y.o. male with a history of ESRD and a left renal mass who underwent left robotic retroperitoneal radical nephrectomy on 3/24/22.    - KUB and exam findings concerning for ileus, will give bisacodyl suppository this AM. Will keep NPO. Will place NG tube if an additional episode of emesis.   - Aspiration precautions  - Protonix  - Trend H/H - s/p 1 U PRBC 3/26 w/ good response  - EKG 3/26 w/ sinus tachycardia, suspect related to anemia  - PT/OT  - Ambulate qid  - HD per nephrology, appreciate recs  - Drains:    PD catheter - maintain, will hold off on PD for 4 weeks post-surgery   Trialysis - removed 3/26 per nephrology recs   Permacath - placed by IR 3/26, will be primary access for HD on discharge  - Home meds  - Pain control  - PPx: SCD's/MARYBEL's/IS/SQH    Dispo: remain hospitalized pending return of bowel function.

## 2022-03-27 NOTE — PT/OT/SLP PROGRESS
Physical Therapy      Patient Name:  Kurtis Plata   MRN:  [id number]    Patient not seen today secondary to Nursing care, Nurse/ DAMARIS hold. PT will continue to follow.     Magan Bolivar PhD, DPT

## 2022-03-27 NOTE — NURSING
Received report to assume patient care. Patient AAOx4.NAD. Denies discomfort. sitting up in chair. Family at BS. NGT noted to SAMUEL luna to LIWS. No complaints. Re-educated to use call light and call for assistance.

## 2022-03-27 NOTE — ASSESSMENT & PLAN NOTE
Kurtis Plata is an 77 y.o. male with a history of ESRD and a left renal mass who underwent left robotic retroperitoneal radical nephrectomy on 3/24/22.    - KUB this AM, will f/u results for possible NGT replacement/need for CT  - Aspiration precautions  - Protonix  - Trend H/H - replace additional 1u pRBC on 3/28  - EKG 3/26 w/ sinus tachycardia, suspect related to anemia  - PT/OT  - Ambulate qid  - HD per nephrology, appreciate recs  - Drains:    PD catheter - maintain, will hold off on PD for 4 weeks post-surgery   Trialysis - removed 3/26 per nephrology recs   Permacath - placed by IR 3/26, will be primary access for HD on discharge  - Home meds  - Pain control  - PPx: SCD's/MARYBEL's/IS/SQH    Dispo: remain hospitalized pending return of full bowel function.

## 2022-03-27 NOTE — PLAN OF CARE
Problem: Occupational Therapy  Goal: Occupational Therapy Goal  Description: Goals to be met by: 4/10/22     Patient will increase functional independence with ADLs by performing:    UE Dressing with [place].  LE Dressing with [place].  Grooming while standing at sink with [place].  Toileting from toilet with [place] for hygiene and clothing management.   Bathing from  shower chair/bench with [place].  Toilet transfer to toilet with [place].  Increased functional strength to WFL for B UE.  Upper extremity exercise program x15 reps per handout, with assistance as needed.    Outcome: Ongoing, Progressing

## 2022-03-27 NOTE — NURSING
"Notified urology resident Dr winter reference daughter would like her father to get ice chips . Urology stated, " only mouth swab and nephology will be seeing the patient . Urology will review his medication and changed as needed due to NG tube placement .  "

## 2022-03-27 NOTE — PROGRESS NOTES
[facility] - [facility]  Urology  Progress Note    Patient Name: Kurtis Plata  MRN: [id number]  Admission Date: 3/24/2022  Hospital Length of Stay: 3 days  Code Status: Prior   Attending Provider: Janes Ochoa MD   Primary Care Physician: Anmol Joe MD    Subjective:     HPI:  Kurtis Plata is an 77 y.o. male with a history of ESRD and a left renal mass who underwent left robotic retroperitoneal radical nephrectomy on 3/24/22.      Interval History: Had an episode of large volume bilious emesis this AM. Endorsing reflux symptoms now without nausea. KUB w/ dilation of bowel w/o large gastric bubble per my read, radiology read pending. Denies passage of flatus or BM's. Hgb improved to 7.6 from 6.5 s/p 1 U PRBC yesterday. Ambulated and pain controlled. Trialysis removed per nephrology recs, permacath remains in place. Afebrile, tachycardia improving.    Objective:     Temp:  [97.2 °F (36.2 °C)-99.4 °F (37.4 °C)] 98.6 °F (37 °C)  Pulse:  [] 97  Resp:  [18] 18  SpO2:  [95 %-100 %] 95 %  BP: (109-162)/(57-73) 162/73     Body mass index is 28.46 kg/m².           Drains       Drain  Duration                  Hemodialysis Catheter 03/24/22 0908 1 day         Hemodialysis Catheter 03/25/22 1447 right internal jugular <1 day                    Physical Exam  Vitals and nursing note reviewed.   Constitutional:       Appearance: Normal appearance.   HENT:      Head: Normocephalic and atraumatic.      Nose: Nose normal.      Mouth/Throat:      Mouth: Mucous membranes are moist.   Eyes:      General: No scleral icterus.     Pupils: Pupils are equal, round, and reactive to light.   Cardiovascular:      Rate and Rhythm: Normal rate.      Comments: Permacath (R chest) in place. Dressing over right neck (former trialysis site).  Pulmonary:      Effort: Pulmonary effort is normal. No respiratory distress.   Abdominal:      General: Abdomen is flat. There is distension.      Tenderness: There is no abdominal tenderness.       Comments: Incisions clean/dry/intact  Abdomen distended and tympanitic, not peritonitic or tender    PD catheter in place, no erythema, fluctuance, or induration surrounding insertion site   Skin:     General: Skin is warm and dry.   Neurological:      General: No focal deficit present.      Mental Status: He is alert and oriented to person, place, and time.   Psychiatric:         Mood and Affect: Mood normal.         Behavior: Behavior normal.         Thought Content: Thought content normal.         Judgment: Judgment normal.       Significant Labs:    BMP:  Recent Labs   Lab 03/26/22  0630 03/26/22  1457 03/27/22  0615   *  134* 134* 134*   K 4.7  4.7 4.6 4.9     104 101 101   CO2 24  24 24 23   BUN 14  14 18 26*   CREATININE 3.9*  3.9* 4.9* 6.5*   CALCIUM 9.5  9.5 9.5 9.9         CBC:   Recent Labs   Lab 03/26/22  0630 03/26/22  1629 03/27/22  0615   WBC 9.01 9.03 8.34   HGB 6.5* 7.4* 7.6*   HCT 21.1* 22.9* 23.6*    163 176         All pertinent labs results from the past 24 hours have been reviewed.    Significant Imaging:  All pertinent imaging results/findings from the past 24 hours have been reviewed.      Assessment/Plan:     * Renal mass, left  Kurtis Plata is an 77 y.o. male with a history of ESRD and a left renal mass who underwent left robotic retroperitoneal radical nephrectomy on 3/24/22.    - KUB (per my read, f/u radiology read) and exam findings concerning for ileus, will give bisacodyl suppository this AM. Will keep NPO. Will place NG tube if an additional episode of emesis.   - Aspiration precautions  - Protonix  - Trend H/H - s/p 1 U PRBC 3/26 w/ good response  - EKG 3/26 w/ sinus tachycardia, suspect related to anemia  - PT/OT  - Ambulate qid  - HD per nephrology, appreciate recs  - Drains:    PD catheter - maintain, will hold off on PD for 4 weeks post-surgery   Trialysis - removed 3/26 per nephrology recs   Permacath - placed by IR 3/26, will be primary access for  HD on discharge  - Home meds  - Pain control  - PPx: SCD's/MARYBEL's/IS/SQH    Dispo: remain hospitalized pending return of bowel function.        VTE Risk Mitigation (From admission, onward)         Ordered     heparin (porcine) injection 5,000 Units  Every 8 hours         03/24/22 5182                Aroldo Yeboah MD  Urology  [facility] - Surgery

## 2022-03-27 NOTE — PT/OT/SLP EVAL
Occupational Therapy   Evaluation    Name: Kurtis Pltaa  MRN: [id number]  Admitting Diagnosis:  Renal mass, left  Recent Surgery: Procedure(s) (LRB):  XI ROBOTIC NEPHRECTOMY/ RETROPERITONEAL (Left) 3 Days Post-Op    Recommendations:     Discharge Recommendations: home health OT  Discharge Equipment Recommendations:  none  Barriers to discharge:  None    Assessment:     Kurtis Plata is a 77 y.o. male with a medical diagnosis of Renal mass, left.  He was able to perform sit/stand T/F c CGA and RW and was able to walk to bathroom c CGA and RW.  Pt began to fatigue while standing at toilet and had to take a seated rest break on toilet.  Unable to walk back to chair in room and chair was brought to bathroom door.  Pt was very lethargic throughout assessment.  B UE are WFL.  Pt is progressing well. Performance deficits affecting function: weakness, impaired endurance, impaired self care skills, impaired functional mobilty, decreased upper extremity function.      Rehab Prognosis: Good; patient would benefit from acute skilled OT services to address these deficits and reach maximum level of function.       Plan:     Patient to be seen 3 x/week to address the above listed problems via self-care/home management, therapeutic activities, therapeutic exercises  · Plan of Care Expires: 04/10/22  · Plan of Care Reviewed with: patient, daughter    Subjective     Chief Complaint: Renal mass  Patient/Family Comments/goals: To get better.    Occupational Profile:  Living Environment: Pt lives in a one story house c [address] and has a rail on the L side of steps.  Has a walk-in shower.  Previous level of function: I PTA  Equipment Used at Home:  shower chair  Assistance upon Discharge: Pt lives c his wife and his daughter is available to assist    Pain/Comfort:  · Pain Rating 1: 0/10    Patients cultural, spiritual, Restorationist conflicts given the current situation: no    Objective:     Communicated with: RN prior to session.  Patient  found up in chair with  (No lines) upon OT entry to room.    General Precautions: Standard, fall   Orthopedic Precautions:N/A   Braces: N/A  Respiratory Status: Room air    Occupational Performance:    Functional Mobility/Transfers:  · Patient completed Sit <> Stand Transfer with contact guard assistance  with  rolling walker   · Patient completed Toilet Transfer Stand Pivot technique with minimum assistance with  rolling walker  · Functional Mobility: Pt was able to walk to bathroom c min A and RW.  Unable to walk back to room d/t fatigue and lethargy.  Chair brought to bathroom door and pt was able to T/F to chair c min A and RW.    Activities of Daily Living:  · Grooming: stand by assistance to wash hands c foam soa while sitting UIC.  · Lower Body Dressing: maximal assistance to don/doff socks and slip on shoes.  · Toileting: minimum assistance for toilet hygiene.    Cognitive/Visual Perceptual:  Cognitive/Psychosocial Skills:     -       Oriented to: Person, Place, Time and Situation   -       Follows Commands/attention:Follows multistep  commands    Physical Exam:  Upper Extremity Range of Motion:     -       Right Upper Extremity: WFL  -       Left Upper Extremity: WFL  Upper Extremity Strength:    -       Right Upper Extremity: WFL  -       Left Upper Extremity: WFL    AMPAC 6 Click ADL:  AMPAC Total Score: 16  Education:    Patient left up in chair with all lines intact, call button in reach and RN notified    GOALS:   Multidisciplinary Problems     Occupational Therapy Goals        Problem: Occupational Therapy    Goal Priority Disciplines Outcome Interventions   Occupational Therapy Goal     OT, PT/OT Ongoing, Progressing    Description: Goals to be met by: 4/10/22     Patient will increase functional independence with ADLs by performing:    UE Dressing with [place].  LE Dressing with [place].  Grooming while standing at sink with [place].  Toileting from toilet with [place] for  hygiene and clothing management.   Bathing from  shower chair/bench with [place].  Toilet transfer to toilet with [place].  Increased functional strength to WFL for B UE.  Upper extremity exercise program x15 reps per handout, with assistance as needed.                     History:     Past Medical History:   Diagnosis Date    Anemia     BPH (benign prostatic hyperplasia)     CKD (chronic kidney disease)     Colon polyps     Dialysis patient     daily peritoneal    Dysphagia     Esophagitis     ESRD (end stage renal disease)     Gallbladder sludge     Gastritis     Gastrointestinal stromal tumor (GIST)     GERD (gastroesophageal reflux disease)     Gout     Hiatal hernia     HTN (hypertension)     Hyperlipidemia     Iron deficiency     Nephrosclerosis     Overweight     Proteinuria     Renal cysts, acquired, bilateral     Sleep apnea     Vitamin D deficiency        Past Surgical History:   Procedure Laterality Date    COLONOSCOPY W/ POLYPECTOMY  06/2017    ENDOSCOPIC ULTRASOUND OF UPPER GASTROINTESTINAL TRACT N/A 4/20/2021    Procedure: ULTRASOUND, UPPER GI TRACT, ENDOSCOPIC;  Surgeon: Anmol Joe MD;  Location: [facility] ([address] [place] [address]);  Service: Endoscopy;  Laterality: N/A;  Covid-19 test 4/17/21 at [place] - pg  Serum K+ before procedure (dialysis patient) - pg    ESOPHAGOGASTRODUODENOSCOPY  02/16/2021    LAPAROSCOPIC ROBOT-ASSISTED SURGICAL REMOVAL OF KIDNEY USING DA TERRI XI Left 3/24/2022    Procedure: XI ROBOTIC NEPHRECTOMY/ RETROPERITONEAL;  Surgeon: Janes Ochoa MD;  Location: [facility] OR [address] [place] [address];  Service: Urology;  Laterality: Left;  4HR/ GEN WITH REGIONAL    PERITONEAL CATHETER INSERTION      RENAL BIOPSY  04/12/2017    VASECTOMY         Time Tracking:     OT Date of Treatment: 03/27/22  OT Start Time: 0833  OT Stop Time: 0855  OT Total Time (min): 22 min    Billable Minutes:Evaluation 11  Self Care/Home Management 11    3/27/2022

## 2022-03-27 NOTE — NURSING
1 unit of PRBC's given for hgb of 6.5, patient tolerated w/o difficulty, family remained at bedside, patient went to sleep after receiving blood and per his family was closer to his mental baseline than earlier. Patient still c/o chest pain which he describes at heartburn, not crushing and doesn't take his breath away; MD notified and also of abdominal distention that is firm but non tender. Patient remains ST and family explained that is why he is staying another day to include watch H&H. Right trialysis cath remved, pressure dressing intact

## 2022-03-28 LAB
ABO + RH BLD: NORMAL
ANION GAP SERPL CALC-SCNC: 15 MMOL/L (ref 8–16)
BASOPHILS # BLD AUTO: 0.02 K/UL (ref 0–0.2)
BASOPHILS NFR BLD: 0.3 % (ref 0–1.9)
BLD GP AB SCN CELLS X3 SERPL QL: NORMAL
BLD PROD TYP BPU: NORMAL
BLOOD UNIT EXPIRATION DATE: NORMAL
BLOOD UNIT TYPE CODE: 7300
BLOOD UNIT TYPE: NORMAL
BUN SERPL-MCNC: 37 MG/DL (ref 8–23)
CALCIUM SERPL-MCNC: 8.9 MG/DL (ref 8.7–10.5)
CHLORIDE SERPL-SCNC: 107 MMOL/L (ref 95–110)
CO2 SERPL-SCNC: 17 MMOL/L (ref 23–29)
CODING SYSTEM: NORMAL
CREAT SERPL-MCNC: 7.8 MG/DL (ref 0.5–1.4)
DIFFERENTIAL METHOD: ABNORMAL
DISPENSE STATUS: NORMAL
EOSINOPHIL # BLD AUTO: 0.3 K/UL (ref 0–0.5)
EOSINOPHIL NFR BLD: 4.1 % (ref 0–8)
ERYTHROCYTE [DISTWIDTH] IN BLOOD BY AUTOMATED COUNT: 16.1 % (ref 11.5–14.5)
EST. GFR  (AFRICAN AMERICAN): 6.9 ML/MIN/1.73 M^2
EST. GFR  (NON AFRICAN AMERICAN): 6 ML/MIN/1.73 M^2
GLUCOSE SERPL-MCNC: 70 MG/DL (ref 70–110)
HCT VFR BLD AUTO: 20.4 % (ref 40–54)
HGB BLD-MCNC: 6.6 G/DL (ref 14–18)
IMM GRANULOCYTES # BLD AUTO: 0.02 K/UL (ref 0–0.04)
IMM GRANULOCYTES NFR BLD AUTO: 0.3 % (ref 0–0.5)
LYMPHOCYTES # BLD AUTO: 0.9 K/UL (ref 1–4.8)
LYMPHOCYTES NFR BLD: 13.3 % (ref 18–48)
MAGNESIUM SERPL-MCNC: 2.2 MG/DL (ref 1.6–2.6)
MCH RBC QN AUTO: 28 PG (ref 27–31)
MCHC RBC AUTO-ENTMCNC: 32.4 G/DL (ref 32–36)
MCV RBC AUTO: 86 FL (ref 82–98)
MONOCYTES # BLD AUTO: 0.9 K/UL (ref 0.3–1)
MONOCYTES NFR BLD: 12.3 % (ref 4–15)
NEUTROPHILS # BLD AUTO: 4.9 K/UL (ref 1.8–7.7)
NEUTROPHILS NFR BLD: 69.7 % (ref 38–73)
NRBC BLD-RTO: 0 /100 WBC
PHOSPHATE SERPL-MCNC: 5.4 MG/DL (ref 2.7–4.5)
PLATELET # BLD AUTO: 171 K/UL (ref 150–450)
PMV BLD AUTO: 11.4 FL (ref 9.2–12.9)
POTASSIUM SERPL-SCNC: 4.9 MMOL/L (ref 3.5–5.1)
RBC # BLD AUTO: 2.36 M/UL (ref 4.6–6.2)
SODIUM SERPL-SCNC: 139 MMOL/L (ref 136–145)
TRANS ERYTHROCYTES VOL PATIENT: NORMAL ML
WBC # BLD AUTO: 7.08 K/UL (ref 3.9–12.7)

## 2022-03-28 PROCEDURE — 86901 BLOOD TYPING SEROLOGIC RH(D): CPT

## 2022-03-28 PROCEDURE — 90935 HEMODIALYSIS ONE EVALUATION: CPT

## 2022-03-28 PROCEDURE — 86900 BLOOD TYPING SEROLOGIC ABO: CPT

## 2022-03-28 PROCEDURE — 85025 COMPLETE CBC W/AUTO DIFF WBC: CPT | Performed by: UROLOGY

## 2022-03-28 PROCEDURE — 94761 N-INVAS EAR/PLS OXIMETRY MLT: CPT

## 2022-03-28 PROCEDURE — 84100 ASSAY OF PHOSPHORUS: CPT | Performed by: UROLOGY

## 2022-03-28 PROCEDURE — 36415 COLL VENOUS BLD VENIPUNCTURE: CPT

## 2022-03-28 PROCEDURE — 63600175 PHARM REV CODE 636 W HCPCS: Performed by: NURSE PRACTITIONER

## 2022-03-28 PROCEDURE — P9021 RED BLOOD CELLS UNIT: HCPCS | Performed by: STUDENT IN AN ORGANIZED HEALTH CARE EDUCATION/TRAINING PROGRAM

## 2022-03-28 PROCEDURE — 36415 COLL VENOUS BLD VENIPUNCTURE: CPT | Performed by: UROLOGY

## 2022-03-28 PROCEDURE — 83735 ASSAY OF MAGNESIUM: CPT | Performed by: UROLOGY

## 2022-03-28 PROCEDURE — 11000001 HC ACUTE MED/SURG PRIVATE ROOM

## 2022-03-28 PROCEDURE — 43752 NASAL/OROGASTRIC W/TUBE PLMT: CPT

## 2022-03-28 PROCEDURE — 90945 PR DIALYSIS, NOT HEMO, 1 EVAL: ICD-10-PCS | Mod: ,,, | Performed by: NURSE PRACTITIONER

## 2022-03-28 PROCEDURE — 90945 DIALYSIS ONE EVALUATION: CPT | Mod: ,,, | Performed by: NURSE PRACTITIONER

## 2022-03-28 PROCEDURE — 86920 COMPATIBILITY TEST SPIN: CPT | Performed by: STUDENT IN AN ORGANIZED HEALTH CARE EDUCATION/TRAINING PROGRAM

## 2022-03-28 PROCEDURE — 25000003 PHARM REV CODE 250: Performed by: STUDENT IN AN ORGANIZED HEALTH CARE EDUCATION/TRAINING PROGRAM

## 2022-03-28 PROCEDURE — 63600175 PHARM REV CODE 636 W HCPCS: Performed by: STUDENT IN AN ORGANIZED HEALTH CARE EDUCATION/TRAINING PROGRAM

## 2022-03-28 PROCEDURE — 80048 BASIC METABOLIC PNL TOTAL CA: CPT | Performed by: UROLOGY

## 2022-03-28 RX ORDER — HEPARIN SODIUM 5000 [USP'U]/ML
5000 INJECTION, SOLUTION INTRAVENOUS; SUBCUTANEOUS
Status: DISCONTINUED | OUTPATIENT
Start: 2022-03-28 | End: 2022-03-28

## 2022-03-28 RX ORDER — HEPARIN SODIUM 1000 [USP'U]/ML
1000 INJECTION, SOLUTION INTRAVENOUS; SUBCUTANEOUS
Status: DISPENSED | OUTPATIENT
Start: 2022-03-28 | End: 2022-03-29

## 2022-03-28 RX ORDER — HYDROCODONE BITARTRATE AND ACETAMINOPHEN 500; 5 MG/1; MG/1
TABLET ORAL
Status: DISCONTINUED | OUTPATIENT
Start: 2022-03-28 | End: 2022-03-31 | Stop reason: HOSPADM

## 2022-03-28 RX ADMIN — HEPARIN SODIUM 5000 UNITS: 5000 INJECTION INTRAVENOUS; SUBCUTANEOUS at 06:03

## 2022-03-28 RX ADMIN — CYANOCOBALAMIN TAB 250 MCG 500 MCG: 250 TAB at 09:03

## 2022-03-28 RX ADMIN — ATORVASTATIN CALCIUM 20 MG: 20 TABLET, FILM COATED ORAL at 09:03

## 2022-03-28 RX ADMIN — HEPARIN SODIUM 1000 UNITS: 1000 INJECTION, SOLUTION INTRAVENOUS; SUBCUTANEOUS at 12:03

## 2022-03-28 RX ADMIN — HEPARIN SODIUM 5000 UNITS: 5000 INJECTION INTRAVENOUS; SUBCUTANEOUS at 09:03

## 2022-03-28 RX ADMIN — METOCLOPRAMIDE 5 MG: 5 TABLET ORAL at 09:03

## 2022-03-28 RX ADMIN — MUPIROCIN: 20 OINTMENT TOPICAL at 09:03

## 2022-03-28 RX ADMIN — DOXAZOSIN 8 MG: 8 TABLET ORAL at 09:03

## 2022-03-28 NOTE — PROGRESS NOTES
HD txt complete.  1.5L UF removed.  Heparin locks in catheter lumens.  Tolerated well.          Pt transported back to his room in a wheelchair.

## 2022-03-28 NOTE — SUBJECTIVE & OBJECTIVE
Interval History: NAEON. Tachy to the 110's last night. Patient pulled the NGT out last night, output of 150 over the last 12 hrs. Denies n/v and flatulence. Did have a small BM overnight. HGB drop to 6.6 this AM, will replace with 1u pRBC. Plans for HD today. Will CTM, closely.        Objective:     Temp:  [96.7 °F (35.9 °C)-98.9 °F (37.2 °C)] 96.8 °F (36 °C)  Pulse:  [] 96  Resp:  [16-18] 16  SpO2:  [94 %-98 %] 97 %  BP: (140-169)/(60-81) 140/60     Body mass index is 28.46 kg/m².           Drains       Drain  Duration                  NG/OG Tube 03/27/22 Left nostril 1 day                    Physical Exam  Vitals and nursing note reviewed.   Constitutional:       Appearance: Normal appearance.   HENT:      Head: Normocephalic and atraumatic.      Nose: Nose normal.      Mouth/Throat:      Mouth: Mucous membranes are moist.   Eyes:      General: No scleral icterus.     Pupils: Pupils are equal, round, and reactive to light.   Cardiovascular:      Rate and Rhythm: Normal rate.      Comments: Permacath (R chest) in place. Dressing over right neck (former trialysis site).  Pulmonary:      Effort: Pulmonary effort is normal. No respiratory distress.   Abdominal:      General: Abdomen is flat. There is distension.      Tenderness: There is no abdominal tenderness.      Comments: Incisions clean/dry/intact  Abdomen distended and tympanitic, not peritonitic or tender    PD catheter in place, no erythema, fluctuance, or induration surrounding insertion site   Skin:     General: Skin is warm and dry.   Neurological:      General: No focal deficit present.      Mental Status: He is alert and oriented to person, place, and time.   Psychiatric:         Mood and Affect: Mood normal.         Behavior: Behavior normal.         Thought Content: Thought content normal.         Judgment: Judgment normal.       Significant Labs:    BMP:  Recent Labs   Lab 03/26/22  1457 03/27/22  0615 03/28/22  0325   * 134* 139   K 4.6  4.9 4.9    101 107   CO2 24 23 17*   BUN 18 26* 37*   CREATININE 4.9* 6.5* 7.8*   CALCIUM 9.5 9.9 8.9       CBC:   Recent Labs   Lab 03/26/22  1629 03/27/22  0615 03/28/22  0325   WBC 9.03 8.34 7.08   HGB 7.4* 7.6* 6.6*   HCT 22.9* 23.6* 20.4*    176 171       All pertinent labs results from the past 24 hours have been reviewed.    Significant Imaging:  All pertinent imaging results/findings from the past 24 hours have been reviewed.

## 2022-03-28 NOTE — PROGRESS NOTES
Participant was consented for Dr. Nadege Light's renal stem cell study, IRB 2011.222 and the informed consent process was conducted by Keny Sexton MD. Please see scanned documents in Media tab reflecting the consenting process.

## 2022-03-28 NOTE — PT/OT/SLP PROGRESS
Physical Therapy      Patient Name:  Kurtis Plata   MRN:  [id number]    Patient not seen today secondary to being off the floor for dialysis in AM and unable to return in PM. Will follow-up 3/29/22.

## 2022-03-28 NOTE — PROGRESS NOTES
ESRD PD patient transition to HD.  HD initiated to RCW catheter.  Tolerated well.         Pt arrived to [facility] in a wheelchair.  Cannot walk enough to weigh.

## 2022-03-28 NOTE — PROGRESS NOTES
IRB# 2011.222.A  [facility] Renal Protocol  Date: March 24, 2022  Specimen ( left kidney and 2 purple top tubes of blood) was retrieved from OR 25.  Specimen was brought to pathology where a slice of normal and tumor was taken per [facility] protocol and brought to Dr. Romo's lab along with blood.

## 2022-03-28 NOTE — NURSING
Called to pt's room by daughter, patient noted with projectile green bile emesis noted to the floors on both sides of bed and on bed, patient states he feels better since vomiting, abdomen rigid, firm and distented, faint bowel sounds noted, Urology on call notified, states the team will be at bedside shortly to address concerns, will order abdominal x-ray, will continue to monitor.

## 2022-03-28 NOTE — NURSING
Patient left for dialysis at 0745. Patient returned to floor, 1.5L of fluid removed, 1 unit of RBC's given. VSS, no outward s/s of distress, family at bedside, NG tube placed in right nares, set to low intermittent suction. by nurse lead ANNAMARIA and Dr. Castillo. Waiting for confirmation from MD, NG tube is ok to clamp for medication administration. Refer to flowsheets and charts for more information.

## 2022-03-28 NOTE — NURSING
Called to patient's room by daughter, stated that patient pulled out ng tube in his sleep, tube fully out with tip intact, patient resting quietly with no complaints, abdomen semi firm, reports some gas, hypoactive bowel sounds noted, no complaints of nausea or vomiting, Urology on call notified, order given to leave ng out for now, will reassess on am rounds, will continue to monitor.

## 2022-03-28 NOTE — PLAN OF CARE
03/28/22 1116   Discharge Plan   Discharge Plan A Home with family   Discharge Plan B Home with family         Referral sent to [facility]  for post-acute needs. Will continue to follow for medical stability.

## 2022-03-28 NOTE — PROGRESS NOTES
[facility]  Urology  Progress Note    Patient Name: Kurtis Plata  MRN: [id number]  Admission Date: 3/24/2022  Hospital Length of Stay: 4 days  Code Status: Prior   Attending Provider: Janes Ochoa MD   Primary Care Physician: Anmol Joe MD    Subjective:     HPI:  Kurtis Plata is an 77 y.o. male with a history of ESRD and a left renal mass who underwent left robotic retroperitoneal radical nephrectomy on 3/24/22.      Interval History: NAEON. Tachy to the 110's last night. Patient pulled the NGT out last night, output of 150 over the last 12 hrs. Denies n/v and flatulence. Did have a small BM overnight. HGB drop to 6.6 this AM, will replace with 1u pRBC. HD per nephrology, tentative plan for proceeding with HD today. Will CTM, closely.        Objective:     Temp:  [96.7 °F (35.9 °C)-98.9 °F (37.2 °C)] 96.8 °F (36 °C)  Pulse:  [] 96  Resp:  [16-18] 16  SpO2:  [94 %-98 %] 97 %  BP: (140-169)/(60-81) 140/60     Body mass index is 28.46 kg/m².           Drains       Drain  Duration                  NG/OG Tube 03/27/22 Left nostril 1 day                    Physical Exam  Vitals and nursing note reviewed.   Constitutional:       Appearance: Normal appearance.   HENT:      Head: Normocephalic and atraumatic.      Nose: Nose normal.      Mouth/Throat:      Mouth: Mucous membranes are moist.   Eyes:      General: No scleral icterus.     Pupils: Pupils are equal, round, and reactive to light.   Cardiovascular:      Rate and Rhythm: Normal rate.      Comments: Permacath (R chest) in place. Dressing over right neck (former trialysis site).  Pulmonary:      Effort: Pulmonary effort is normal. No respiratory distress.   Abdominal:      General: Abdomen is flat. There is distension.      Tenderness: There is no abdominal tenderness.      Comments: Incisions clean/dry/intact  Abdomen distended and tympanitic, not peritonitic or tender    PD catheter in place, no erythema, fluctuance, or induration  surrounding insertion site   Skin:     General: Skin is warm and dry.   Neurological:      General: No focal deficit present.      Mental Status: He is alert and oriented to person, place, and time.   Psychiatric:         Mood and Affect: Mood normal.         Behavior: Behavior normal.         Thought Content: Thought content normal.         Judgment: Judgment normal.       Significant Labs:    BMP:  Recent Labs   Lab 03/26/22  1457 03/27/22  0615 03/28/22  0325   * 134* 139   K 4.6 4.9 4.9    101 107   CO2 24 23 17*   BUN 18 26* 37*   CREATININE 4.9* 6.5* 7.8*   CALCIUM 9.5 9.9 8.9       CBC:   Recent Labs   Lab 03/26/22  1629 03/27/22  0615 03/28/22  0325   WBC 9.03 8.34 7.08   HGB 7.4* 7.6* 6.6*   HCT 22.9* 23.6* 20.4*    176 171       All pertinent labs results from the past 24 hours have been reviewed.    Significant Imaging:  All pertinent imaging results/findings from the past 24 hours have been reviewed.                    Assessment/Plan:     * Renal mass, left  Kurtis Plata is an 77 y.o. male with a history of ESRD and a left renal mass who underwent left robotic retroperitoneal radical nephrectomy on 3/24/22.    - KUB this AM, will f/u results for possible NGT replacement/need for CT  - Aspiration precautions  - Protonix  - Trend H/H - replace additional 1u pRBC on 3/28  - EKG 3/26 w/ sinus tachycardia, suspect related to anemia  - PT/OT  - Ambulate qid  - HD per nephrology, appreciate recs  - Drains:    PD catheter - maintain, will hold off on PD for 4 weeks post-surgery   Trialysis - removed 3/26 per nephrology recs   Permacath - placed by IR 3/26, will be primary access for HD on discharge  - Home meds  - Pain control  - PPx: SCD's/MARYBEL's/IS/SQH    Dispo: remain hospitalized pending return of full bowel function.        VTE Risk Mitigation (From admission, onward)         Ordered     heparin (porcine) injection 5,000 Units  Every 8 hours         03/24/22 8808                 Mode Castillo MD  Urology  [facility]

## 2022-03-28 NOTE — PROGRESS NOTES
[facility] ST[facility]FF HEMODIALYSIS NOTE     Patient currently on hemodialysis for removal of uremic toxins and volume.     Patient seen and evaluated on hemodialysis, tolerating treatment, see HD flowsheet for vitals and assessments.    Labs have been reviewed and the dialysate bath has been adjusted.       Assessment/Plan:    -Patient seen on HD, tolerating treatment well, w/o complaints   -UF goal of 2-3L as tolerated  -Renal diet, if not NPO   -Strict I/O's and daily weights  -Daily renal function panels  -Keep MAP >65 while on HD   -Maintain Hgb >7.0  -hgb 6.6, 1U PRBCs   - PD cath to be flushed today   -Will continue to follow while inpatient     Edilma Hunt DNP-FNP, C  Nephrology  Pager: [phone]

## 2022-03-29 LAB
ANION GAP SERPL CALC-SCNC: 17 MMOL/L (ref 8–16)
BASOPHILS # BLD AUTO: 0.01 K/UL (ref 0–0.2)
BASOPHILS NFR BLD: 0.1 % (ref 0–1.9)
BUN SERPL-MCNC: 28 MG/DL (ref 8–23)
CALCIUM SERPL-MCNC: 8.9 MG/DL (ref 8.7–10.5)
CHLORIDE SERPL-SCNC: 104 MMOL/L (ref 95–110)
CO2 SERPL-SCNC: 19 MMOL/L (ref 23–29)
CREAT SERPL-MCNC: 5.6 MG/DL (ref 0.5–1.4)
DIFFERENTIAL METHOD: ABNORMAL
EOSINOPHIL # BLD AUTO: 0.2 K/UL (ref 0–0.5)
EOSINOPHIL NFR BLD: 2.9 % (ref 0–8)
ERYTHROCYTE [DISTWIDTH] IN BLOOD BY AUTOMATED COUNT: 15.7 % (ref 11.5–14.5)
EST. GFR  (AFRICAN AMERICAN): 10.3 ML/MIN/1.73 M^2
EST. GFR  (NON AFRICAN AMERICAN): 8.9 ML/MIN/1.73 M^2
GLUCOSE SERPL-MCNC: 75 MG/DL (ref 70–110)
HCT VFR BLD AUTO: 23.6 % (ref 40–54)
HGB BLD-MCNC: 7.7 G/DL (ref 14–18)
IMM GRANULOCYTES # BLD AUTO: 0.03 K/UL (ref 0–0.04)
IMM GRANULOCYTES NFR BLD AUTO: 0.4 % (ref 0–0.5)
LYMPHOCYTES # BLD AUTO: 0.8 K/UL (ref 1–4.8)
LYMPHOCYTES NFR BLD: 10.8 % (ref 18–48)
MAGNESIUM SERPL-MCNC: 2.1 MG/DL (ref 1.6–2.6)
MCH RBC QN AUTO: 28.4 PG (ref 27–31)
MCHC RBC AUTO-ENTMCNC: 32.6 G/DL (ref 32–36)
MCV RBC AUTO: 87 FL (ref 82–98)
MONOCYTES # BLD AUTO: 1.1 K/UL (ref 0.3–1)
MONOCYTES NFR BLD: 14.9 % (ref 4–15)
NEUTROPHILS # BLD AUTO: 5.3 K/UL (ref 1.8–7.7)
NEUTROPHILS NFR BLD: 70.9 % (ref 38–73)
NRBC BLD-RTO: 0 /100 WBC
PHOSPHATE SERPL-MCNC: 4.5 MG/DL (ref 2.7–4.5)
PLATELET # BLD AUTO: 174 K/UL (ref 150–450)
PMV BLD AUTO: 10.6 FL (ref 9.2–12.9)
POCT GLUCOSE: 81 MG/DL (ref 70–110)
POCT GLUCOSE: 92 MG/DL (ref 70–110)
POCT GLUCOSE: 95 MG/DL (ref 70–110)
POTASSIUM SERPL-SCNC: 4.3 MMOL/L (ref 3.5–5.1)
RBC # BLD AUTO: 2.71 M/UL (ref 4.6–6.2)
SODIUM SERPL-SCNC: 140 MMOL/L (ref 136–145)
WBC # BLD AUTO: 7.53 K/UL (ref 3.9–12.7)

## 2022-03-29 PROCEDURE — 80048 BASIC METABOLIC PNL TOTAL CA: CPT | Performed by: UROLOGY

## 2022-03-29 PROCEDURE — 36415 COLL VENOUS BLD VENIPUNCTURE: CPT | Performed by: UROLOGY

## 2022-03-29 PROCEDURE — C9113 INJ PANTOPRAZOLE SODIUM, VIA: HCPCS | Performed by: STUDENT IN AN ORGANIZED HEALTH CARE EDUCATION/TRAINING PROGRAM

## 2022-03-29 PROCEDURE — 63600175 PHARM REV CODE 636 W HCPCS: Performed by: STUDENT IN AN ORGANIZED HEALTH CARE EDUCATION/TRAINING PROGRAM

## 2022-03-29 PROCEDURE — 84100 ASSAY OF PHOSPHORUS: CPT | Performed by: UROLOGY

## 2022-03-29 PROCEDURE — 11000001 HC ACUTE MED/SURG PRIVATE ROOM

## 2022-03-29 PROCEDURE — 97161 PT EVAL LOW COMPLEX 20 MIN: CPT

## 2022-03-29 PROCEDURE — 43752 NASAL/OROGASTRIC W/TUBE PLMT: CPT

## 2022-03-29 PROCEDURE — 85025 COMPLETE CBC W/AUTO DIFF WBC: CPT | Performed by: UROLOGY

## 2022-03-29 PROCEDURE — 25000003 PHARM REV CODE 250

## 2022-03-29 PROCEDURE — 83735 ASSAY OF MAGNESIUM: CPT | Performed by: UROLOGY

## 2022-03-29 PROCEDURE — 97530 THERAPEUTIC ACTIVITIES: CPT

## 2022-03-29 PROCEDURE — 25000003 PHARM REV CODE 250: Performed by: STUDENT IN AN ORGANIZED HEALTH CARE EDUCATION/TRAINING PROGRAM

## 2022-03-29 PROCEDURE — 97116 GAIT TRAINING THERAPY: CPT

## 2022-03-29 RX ORDER — GLYCERIN 1 G/1
1 SUPPOSITORY RECTAL ONCE
Status: COMPLETED | OUTPATIENT
Start: 2022-03-29 | End: 2022-03-29

## 2022-03-29 RX ORDER — BISACODYL 10 MG
10 SUPPOSITORY, RECTAL RECTAL DAILY
Status: DISCONTINUED | OUTPATIENT
Start: 2022-03-30 | End: 2022-03-30

## 2022-03-29 RX ORDER — SODIUM CHLORIDE 9 MG/ML
INJECTION, SOLUTION INTRAVENOUS ONCE
Status: COMPLETED | OUTPATIENT
Start: 2022-03-30 | End: 2022-03-30

## 2022-03-29 RX ADMIN — HEPARIN SODIUM 5000 UNITS: 5000 INJECTION INTRAVENOUS; SUBCUTANEOUS at 01:03

## 2022-03-29 RX ADMIN — ATORVASTATIN CALCIUM 20 MG: 20 TABLET, FILM COATED ORAL at 09:03

## 2022-03-29 RX ADMIN — HEPARIN SODIUM 5000 UNITS: 5000 INJECTION INTRAVENOUS; SUBCUTANEOUS at 10:03

## 2022-03-29 RX ADMIN — PANTOPRAZOLE SODIUM 40 MG: 40 INJECTION, POWDER, FOR SOLUTION INTRAVENOUS at 09:03

## 2022-03-29 RX ADMIN — AMLODIPINE BESYLATE 10 MG: 10 TABLET ORAL at 09:03

## 2022-03-29 RX ADMIN — CYANOCOBALAMIN TAB 250 MCG 500 MCG: 250 TAB at 09:03

## 2022-03-29 RX ADMIN — CINACALCET 30 MG: 30 TABLET, FILM COATED ORAL at 09:03

## 2022-03-29 RX ADMIN — HYDROCHLOROTHIAZIDE 25 MG: 25 TABLET ORAL at 09:03

## 2022-03-29 RX ADMIN — METOCLOPRAMIDE 5 MG: 5 TABLET ORAL at 09:03

## 2022-03-29 RX ADMIN — GLYCERIN 1 SUPPOSITORY: 2 SUPPOSITORY RECTAL at 06:03

## 2022-03-29 RX ADMIN — HEPARIN SODIUM 5000 UNITS: 5000 INJECTION INTRAVENOUS; SUBCUTANEOUS at 05:03

## 2022-03-29 RX ADMIN — ALLOPURINOL 150 MG: 300 TABLET ORAL at 09:03

## 2022-03-29 RX ADMIN — LOSARTAN POTASSIUM 50 MG: 50 TABLET, FILM COATED ORAL at 09:03

## 2022-03-29 RX ADMIN — SODIUM CHLORIDE 250 ML: 0.9 INJECTION, SOLUTION INTRAVENOUS at 06:03

## 2022-03-29 RX ADMIN — DOXAZOSIN 8 MG: 8 TABLET ORAL at 09:03

## 2022-03-29 NOTE — PT/OT/SLP EVAL
Physical Therapy Co-Evaluation    Patient Name:  Kurtis Plata   MRN:  [id number]    Recommendations:     Discharge Recommendations:  home with home health   Discharge Equipment Recommendations: wheelchair   Barriers to discharge: None    Assessment:       Kurtis Plata is a 77 y.o. male admitted with a medical diagnosis of Renal mass, left.  He presents with the following impairments/functional limitations:  weakness, impaired endurance, impaired self care skills, impaired functional mobilty, gait instability, impaired balance, decreased lower extremity function.      Patient demonstrates decreased independence secondary to weakness, fatigue and mild confusion.  Patient required between min A and min A x 2 for bed mobility, transfers and ambulation.  Patient ambulated 150 ft x 2 with B handheld support.  Patient ambulated better with handheld support secondary to mild confusion and some difficulty managing rolling walker.  Patient will benefit from skilled PT for strengthening and mobility training in an acute care and home health setting.      Rehab Prognosis: Good; patient would benefit from acute skilled PT services 4 x/week to address these deficits and reach maximum level of function.  Patient is most appropriate to go to home with home health .  Recent Surgery: Procedure(s) (LRB):  XI ROBOTIC NEPHRECTOMY/ RETROPERITONEAL (Left) 5 Days Post-Op    Plan:     During this hospitalization, patient to be seen 4 x/week to address the identified rehab impairments via gait training, therapeutic activities, therapeutic exercises, neuromuscular re-education and progress toward the following goals:    · Plan of Care Expires:  04/28/22    Subjective     Subjective: Family reporting patient has been feeling better today than previous days.  Pain/Comfort:  · Pain Rating 1:  (did not rate)  · Location - Orientation 1: generalized  · Location 1: abdomen  · Pain Addressed 1: Pre-medicate for activity, Reposition, Cessation of  Activity  · Pain Rating Post-Intervention 1:  (did not rate)    Patients cultural, spiritual, Mormon conflicts given the current situation: no    Living Environment:  Prior level of function: Patient lives with his wife and daughter (EMT) in a single story home with 4 steps to enter with L handrail.  Patient has a walk in shower with a chair.  Patient was previously independent.    Support available upon discharge: family  Equipment owned: shower chair  Objective:     Communicated with nursing prior to session.  Patient found supine with NG tube, SCD  upon PT entry to room.    General Precautions: Standard, fall   Orthopedic Precautions:N/A   Braces: N/A     Exams:  · RLE ROM: WFL  · RLE Strength: grossly 4/5  · LLE ROM: WFL  · LLE Strength: grossly 4/5  · Cognitive Exam:  Patient is oriented to Person and Place      Functional Mobility:  · Bed Mobility:     · Supine to Sit: minimum assistance for LE management and trunk management  · Sit to Supine: minimum assistance for LE management and trunk management  · Transfers:     · Sit to Stand: minimum assistance with handheld support for balance  · Gait: Patient ambulated 150 ft x 2 with bilateral hand-held assist and contact guard assistance.  · Balance:   · Sitting: GOOD: Maintains balance through MODERATE excursions of active trunk movement  · Standing: FAIR: Needs CONTACT GUARD during gait      Therapeutic Activities and Exercises:   Patient ambulated 150 ft x 2 with B handheld assistance and CGA.  Patient with decreased trunk stability limiting overall safety and independence.   Gait training:  Patient required cues for width of base of support to increase independence and safety.  Patient required cues ~ 25% of the time.    Patient Education:    Patient educated on assistive device use, bed mobility training, Fall risk, gait training, home safety, Home exercise program, plan of care and transfer training by explanation.  Patient was receptive to education and  verbalizes understanding.     AM-PAC 6 CLICK MOBILITY  Total Score:19     Patient left supine with all lines intact and call button in reach.    GOALS:   Multidisciplinary Problems     Physical Therapy Goals        Problem: Physical Therapy    Goal Priority Disciplines Outcome Goal Variances Interventions   Physical Therapy Goal     PT, PT/OT Ongoing, Progressing     Description: Goals to be met by: 22    Patient will increase functional independence with mobility by performin. Supine to sit with Stand-by Assistance  2. Sit to supine with Stand-by Assistance  3. Sit to stand transfer with Contact Guard Assistance  4. Gait  x 150 feet with Stand-by Assistance using least restrictive assistive device.   5.  Patient will demonstrate independence with a home exercise program  6. Patient's balance will be GOOD: Needs SUPERVISION only during gait and able to self right with moderate LOB.  7. Ascend/descend 4 stair with bilateral Handrails Contact Guard Assistance using No Assistive Device.                    History:     Past Medical History:   Diagnosis Date    Anemia     BPH (benign prostatic hyperplasia)     CKD (chronic kidney disease)     Colon polyps     Dialysis patient     daily peritoneal    Dysphagia     Esophagitis     ESRD (end stage renal disease)     Gallbladder sludge     Gastritis     Gastrointestinal stromal tumor (GIST)     GERD (gastroesophageal reflux disease)     Gout     Hiatal hernia     HTN (hypertension)     Hyperlipidemia     Iron deficiency     Nephrosclerosis     Overweight     Proteinuria     Renal cysts, acquired, bilateral     Sleep apnea     Vitamin D deficiency        Past Surgical History:   Procedure Laterality Date    COLONOSCOPY W/ POLYPECTOMY  2017    ENDOSCOPIC ULTRASOUND OF UPPER GASTROINTESTINAL TRACT N/A 2021    Procedure: ULTRASOUND, UPPER GI TRACT, ENDOSCOPIC;  Surgeon: Anmol Joe MD;  Location: [facility] ([address] [place] [address]);  Service:  Endoscopy;  Laterality: N/A;  Covid-19 test 4/17/21 at [facility] -   Serum K+ before procedure (dialysis patient) - pg    ESOPHAGOGASTRODUODENOSCOPY  02/16/2021    LAPAROSCOPIC ROBOT-ASSISTED SURGICAL REMOVAL OF KIDNEY USING DA TERRI XI Left 3/24/2022    Procedure: XI ROBOTIC NEPHRECTOMY/ RETROPERITONEAL;  Surgeon: Janes Ochoa MD;  Location: [facility] OR [address] [place] [address];  Service: Urology;  Laterality: Left;  4HR/ GEN WITH REGIONAL    PERITONEAL CATHETER INSERTION      RENAL BIOPSY  04/12/2017    VASECTOMY         Time Tracking:     PT Received On: 03/29/22  PT Start Time: 1023     PT Stop Time: 1043  PT Total Time (min): 20 min     Billable Minutes: Evaluation 10 min Gait Training 10 min      Bowen Gutiérrez, PT  03/29/2022    Co-treatment performed for this visit as family concerned about endurance.  Co-tx completed to accommodate for patient activity tolerance endurance.

## 2022-03-29 NOTE — PT/OT/SLP PROGRESS
Occupational Therapy   Treatment    Name: Kurtis Plata  MRN: [id number]  Admitting Diagnosis:  Renal mass, left  5 Days Post-Op    Recommendations:     Discharge Recommendations: home with home health  Discharge Equipment Recommendations:  none  Barriers to discharge:  None    Assessment:     Kurtis Plata is a 77 y.o. male with a medical diagnosis of Renal mass, left.  He presents with the following. Performance deficits affecting function are weakness, impaired endurance, impaired self care skills, impaired functional mobilty, gait instability, impaired balance, decreased lower extremity function, decreased safety awareness, pain, orthopedic precautions.    Pt agreeable to therapy with second attempt. Pt required Min A in bed mobility and Min A x2 HHA to ambulate ~200ft to increase activity tolerance. Education provided on the benefit of mobility to aid in recovery. Pt and family acknowledged understanding. Continue Ot POC     Rehab Prognosis:  Good; patient would benefit from acute skilled OT services to address these deficits and reach maximum level of function.       Plan:     Patient to be seen 3 x/week to address the above listed problems via self-care/home management, therapeutic activities, therapeutic exercises  · Plan of Care Expires: 04/10/22  · Plan of Care Reviewed with: patient, family    Subjective     Pain/Comfort:  · Pain Rating 1: 0/10  · Location 1: abdomen    Objective:     Communicated with: Nursing prior to session.  Patient found supine with NG tube, SCD upon OT entry to room.    General Precautions: Standard, fall   Orthopedic Precautions:N/A   Braces: N/A     Occupational Performance:     Bed Mobility:    · Patient completed Scooting/Bridging with minimum assistance  · Patient completed Supine to Sit with minimum assistance  · Patient completed Sit to Supine with minimum assistance     Functional Mobility/Transfers:  · Patient completed Sit <> Stand Transfer with minimum assistance and  of 2 persons  with  hand-held assist   · Functional Mobility: Pt agreeable to therapy with second attempt. Pt required Min A in bed mobility and Min A x2 HHA to ambulate ~200ft to increase activity tolerance. Education provided on the benefit of mobility to aid in recovery. Pt and family acknowledged understanding. Continue Ot POC     Activities of Daily Living:  · Upper Body Dressing: minimum assistance kehinde gown  · Lower Body Dressing: maximal assistance to adjust socks      AMPA 6 Click ADL: 16    Treatment & Education:  - OT POC   - safety w/ functional mobility; hand placement to ensure safe transfers to various surfaces in prep for ADLs  - Reviewed gait sequence and RW management via verbalization and demonstration   - encouraged to ambulate within household environment at least every hour to hour 1/2    Patient left supine with all lines intact, call button in reach and RN notified    GOALS:   Multidisciplinary Problems     Occupational Therapy Goals        Problem: Occupational Therapy    Goal Priority Disciplines Outcome Interventions   Occupational Therapy Goal     OT, PT/OT Ongoing, Progressing    Description: Goals to be met by: 4/10/22     Patient will increase functional independence with ADLs by performing:    UE Dressing with [place].  LE Dressing with [place].  Grooming while standing at sink with [place].  Toileting from toilet with [place] for hygiene and clothing management.   Bathing from  shower chair/bench with [place].  Toilet transfer to toilet with [place].  Increased functional strength to WFL for B UE.  Upper extremity exercise program x15 reps per handout, with assistance as needed.                     Time Tracking:     OT Date of Treatment: 03/29/22  OT Start Time: 1023  OT Stop Time: 1043  OT Total Time (min): 20 min    Billable Minutes:Therapeutic Activity 20    Michell Sánchez OT  3/29/2022    Co-tx performed for this visit due to patient need for two  skilled therapists due to fatigue and decreased activity tolerance.

## 2022-03-29 NOTE — PROGRESS NOTES
[facility]  Urology  Progress Note    Patient Name: Kurtis Plata  MRN: [id number]  Admission Date: 3/24/2022  Hospital Length of Stay: 5 days  Code Status: Prior   Attending Provider: Janes Ochoa MD   Primary Care Physician: Anmol Joe MD    Subjective:     HPI:  Kurtis Plata is an 77 y.o. male with a history of ESRD and a left renal mass who underwent left robotic retroperitoneal radical nephrectomy on 3/24/22.      Interval History: NAEON. VSS. Abdomen improved from yesterday, soft. Patient states he has been having flatus. Pain is will controlled, walking. No BM yet. No n/v overnight. NGT with 100cc out overnight. Hgb responded appropriately to 1u pRBC. Will CTM.       Objective:     Temp:  [96.3 °F (35.7 °C)-100.2 °F (37.9 °C)] 99.3 °F (37.4 °C)  Pulse:  [] 97  Resp:  [17-20] 20  SpO2:  [95 %-100 %] 97 %  BP: (127-167)/(59-83) 161/71     Body mass index is 28.46 kg/m².           Drains       Drain  Duration                  NG/OG Tube 03/28/22 1445 Right nostril <1 day                    Physical Exam  Vitals and nursing note reviewed.   Constitutional:       Appearance: Normal appearance.   HENT:      Head: Normocephalic and atraumatic.      Nose: Nose normal.      Mouth/Throat:      Mouth: Mucous membranes are moist.   Eyes:      General: No scleral icterus.     Extraocular Movements: Extraocular movements intact.      Pupils: Pupils are equal, round, and reactive to light.   Cardiovascular:      Rate and Rhythm: Normal rate.      Comments: Permacath (R chest) in place. Dressing over right neck (former trialysis site).  Pulmonary:      Effort: Pulmonary effort is normal. No respiratory distress.   Abdominal:      General: Abdomen is flat. There is distension.      Tenderness: There is no abdominal tenderness.      Comments: Incisions clean/dry/intact  Abdomen with improved distention not tympanitic, not peritonitic or tender.    PD catheter in place, no erythema, fluctuance, or  induration surrounding insertion site   Musculoskeletal:         General: Normal range of motion.      Cervical back: Normal range of motion.   Skin:     General: Skin is warm and dry.   Neurological:      General: No focal deficit present.      Mental Status: He is alert and oriented to person, place, and time. Mental status is at baseline.   Psychiatric:         Mood and Affect: Mood normal.         Behavior: Behavior normal.         Thought Content: Thought content normal.         Judgment: Judgment normal.       Significant Labs:    BMP:  Recent Labs   Lab 03/27/22  0615 03/28/22 0325 03/29/22 0327   * 139 140   K 4.9 4.9 4.3    107 104   CO2 23 17* 19*   BUN 26* 37* 28*   CREATININE 6.5* 7.8* 5.6*   CALCIUM 9.9 8.9 8.9       CBC:   Recent Labs   Lab 03/27/22 0615 03/28/22 0325 03/29/22 0327   WBC 8.34 7.08 7.53   HGB 7.6* 6.6* 7.7*   HCT 23.6* 20.4* 23.6*    171 174       All pertinent labs results from the past 24 hours have been reviewed.    Significant Imaging:  All pertinent imaging results/findings from the past 24 hours have been reviewed.                    Assessment/Plan:     * Renal mass, left  Kurtis Plata is an 77 y.o. male with a history of ESRD and a left renal mass who underwent left robotic retroperitoneal radical nephrectomy on 3/24/22.    - Continue NGT  - Aspiration precautions  - Protonix  - Trend H/H - replaced additional 1u pRBC on 3/28, hgb responded appropriately   - EKG 3/26 w/ sinus tachycardia, suspect related to anemia  - PT/OT  - Ambulate qid  - HD per nephrology, appreciate recs  - Drains:    PD catheter - maintain, will hold off on PD for 4 weeks post-surgery   Trialysis - removed 3/26 per nephrology recs   Permacath - placed by IR 3/26, will be primary access for HD on discharge  - Home meds  - Pain control  - PPx: SCD's/MARYBEL's/IS/SQH    Dispo: remain hospitalized pending return of full bowel function.        VTE Risk Mitigation (From admission, onward)          Ordered     heparin (porcine) injection 1,000 Units  As needed (PRN)         03/28/22 1216     heparin (porcine) injection 5,000 Units  Every 8 hours         03/24/22 1741                Mode Castillo MD  Urology  [facility] - Surgery

## 2022-03-29 NOTE — PLAN OF CARE
Spoke with Deandre MATA at [facility] in [place]. Faxed clinical updates as requested to [phone]. Patient to have outpatient HD at discharge until able to return to peritoneal dialysis at home. Will continue to follow for d/c needs.

## 2022-03-29 NOTE — ASSESSMENT & PLAN NOTE
Kurtis Plata is an 77 y.o. male with a history of ESRD and a left renal mass who underwent left robotic retroperitoneal radical nephrectomy on 3/24/22.    - Continue NGT  - Aspiration precautions  - Protonix  - Trend H/H - replaced additional 1u pRBC on 3/28, hgb responded appropriately   - EKG 3/26 w/ sinus tachycardia, suspect related to anemia  - PT/OT  - Ambulate qid  - HD per nephrology, appreciate recs  - Drains:    PD catheter - maintain, will hold off on PD for 4 weeks post-surgery   Trialysis - removed 3/26 per nephrology recs   Permacath - placed by IR 3/26, will be primary access for HD on discharge  - Home meds  - Pain control  - PPx: SCD's/MARYBEL's/IS/SQH    Dispo: remain hospitalized pending return of full bowel function.

## 2022-03-29 NOTE — SUBJECTIVE & OBJECTIVE
Interval History: NAEON. VSS. Abdomen improved from yesterday, soft. Patient states he has been having flatus. Pain is will controlled, walking. No BM yet. No n/v overnight. NGT with 100cc out overnight. Hgb responded appropriately to 1u pRBC. Will CTM.       Objective:     Temp:  [96.3 °F (35.7 °C)-100.2 °F (37.9 °C)] 99.3 °F (37.4 °C)  Pulse:  [] 97  Resp:  [17-20] 20  SpO2:  [95 %-100 %] 97 %  BP: (127-167)/(59-83) 161/71     Body mass index is 28.46 kg/m².           Drains       Drain  Duration                  NG/OG Tube 03/28/22 1445 Right nostril <1 day                    Physical Exam  Vitals and nursing note reviewed.   Constitutional:       Appearance: Normal appearance.   HENT:      Head: Normocephalic and atraumatic.      Nose: Nose normal.      Mouth/Throat:      Mouth: Mucous membranes are moist.   Eyes:      General: No scleral icterus.     Extraocular Movements: Extraocular movements intact.      Pupils: Pupils are equal, round, and reactive to light.   Cardiovascular:      Rate and Rhythm: Normal rate.      Comments: Permacath (R chest) in place. Dressing over right neck (former trialysis site).  Pulmonary:      Effort: Pulmonary effort is normal. No respiratory distress.   Abdominal:      General: Abdomen is flat. There is distension.      Tenderness: There is no abdominal tenderness.      Comments: Incisions clean/dry/intact  Abdomen with improved distention not tympanitic, not peritonitic or tender.    PD catheter in place, no erythema, fluctuance, or induration surrounding insertion site   Musculoskeletal:         General: Normal range of motion.      Cervical back: Normal range of motion.   Skin:     General: Skin is warm and dry.   Neurological:      General: No focal deficit present.      Mental Status: He is alert and oriented to person, place, and time. Mental status is at baseline.   Psychiatric:         Mood and Affect: Mood normal.         Behavior: Behavior normal.         Thought  Content: Thought content normal.         Judgment: Judgment normal.       Significant Labs:    BMP:  Recent Labs   Lab 03/27/22  0615 03/28/22 0325 03/29/22 0327   * 139 140   K 4.9 4.9 4.3    107 104   CO2 23 17* 19*   BUN 26* 37* 28*   CREATININE 6.5* 7.8* 5.6*   CALCIUM 9.9 8.9 8.9       CBC:   Recent Labs   Lab 03/27/22  0615 03/28/22 0325 03/29/22 0327   WBC 8.34 7.08 7.53   HGB 7.6* 6.6* 7.7*   HCT 23.6* 20.4* 23.6*    171 174       All pertinent labs results from the past 24 hours have been reviewed.    Significant Imaging:  All pertinent imaging results/findings from the past 24 hours have been reviewed.

## 2022-03-29 NOTE — PLAN OF CARE
Problem: Physical Therapy  Goal: Physical Therapy Goal  Description: Goals to be met by: 22    Patient will increase functional independence with mobility by performin. Supine to sit with Stand-by Assistance  2. Sit to supine with Stand-by Assistance  3. Sit to stand transfer with Contact Guard Assistance  4. Gait  x 150 feet with Stand-by Assistance using least restrictive assistive device.   5.  Patient will demonstrate independence with a home exercise program  6. Patient's balance will be GOOD: Needs SUPERVISION only during gait and able to self right with moderate LOB.  7. Ascend/descend 4 stair with bilateral Handrails Contact Guard Assistance using No Assistive Device.   Outcome: Ongoing, Progressing     PT evaluation completed, goals established and plan of care reviewed with patient.  Patient demonstrates decreased independence secondary to weakness, fatigue and mild confusion.  Patient required between min A and min A x 2 for bed mobility, transfers and ambulation.  Patient ambulated 150 ft x 2 with B handheld support.  Patient ambulated better with handheld support secondary to mild confusion and some difficulty managing rolling walker.  Patient will benefit from skilled PT for strengthening and mobility training in an acute care and home health setting.      Bowen Gutiérrez, PT, DPT  3/29/2022

## 2022-03-29 NOTE — PLAN OF CARE
Spoke with [facility]. Patient accepted for admission when medically stable. PT recommended Wheelchair for home. Equipment ordered per [facility] for delivery to bedside prior to d/c. Will continue to follow for needs.

## 2022-03-29 NOTE — CONSULTS
"20G 1'3/4" RFA using ultrasound guiding.  " 

## 2022-03-30 LAB
ANION GAP SERPL CALC-SCNC: 14 MMOL/L (ref 8–16)
BASOPHILS # BLD AUTO: 0.01 K/UL (ref 0–0.2)
BASOPHILS NFR BLD: 0.1 % (ref 0–1.9)
BUN SERPL-MCNC: 44 MG/DL (ref 8–23)
CALCIUM SERPL-MCNC: 9.2 MG/DL (ref 8.7–10.5)
CHLORIDE SERPL-SCNC: 107 MMOL/L (ref 95–110)
CO2 SERPL-SCNC: 20 MMOL/L (ref 23–29)
CREAT SERPL-MCNC: 7.8 MG/DL (ref 0.5–1.4)
DIFFERENTIAL METHOD: ABNORMAL
EOSINOPHIL # BLD AUTO: 0.3 K/UL (ref 0–0.5)
EOSINOPHIL NFR BLD: 4.7 % (ref 0–8)
ERYTHROCYTE [DISTWIDTH] IN BLOOD BY AUTOMATED COUNT: 15.8 % (ref 11.5–14.5)
EST. GFR  (AFRICAN AMERICAN): 6.9 ML/MIN/1.73 M^2
EST. GFR  (NON AFRICAN AMERICAN): 6 ML/MIN/1.73 M^2
GLUCOSE SERPL-MCNC: 74 MG/DL (ref 70–110)
HCT VFR BLD AUTO: 23.5 % (ref 40–54)
HGB BLD-MCNC: 7.6 G/DL (ref 14–18)
IMM GRANULOCYTES # BLD AUTO: 0.03 K/UL (ref 0–0.04)
IMM GRANULOCYTES NFR BLD AUTO: 0.4 % (ref 0–0.5)
LYMPHOCYTES # BLD AUTO: 1.1 K/UL (ref 1–4.8)
LYMPHOCYTES NFR BLD: 15.6 % (ref 18–48)
MAGNESIUM SERPL-MCNC: 2.3 MG/DL (ref 1.6–2.6)
MCH RBC QN AUTO: 28.3 PG (ref 27–31)
MCHC RBC AUTO-ENTMCNC: 32.3 G/DL (ref 32–36)
MCV RBC AUTO: 87 FL (ref 82–98)
MONOCYTES # BLD AUTO: 1.2 K/UL (ref 0.3–1)
MONOCYTES NFR BLD: 15.9 % (ref 4–15)
NEUTROPHILS # BLD AUTO: 4.6 K/UL (ref 1.8–7.7)
NEUTROPHILS NFR BLD: 63.3 % (ref 38–73)
NRBC BLD-RTO: 0 /100 WBC
PHOSPHATE SERPL-MCNC: 5.7 MG/DL (ref 2.7–4.5)
PLATELET # BLD AUTO: 193 K/UL (ref 150–450)
PMV BLD AUTO: 10.8 FL (ref 9.2–12.9)
POCT GLUCOSE: 103 MG/DL (ref 70–110)
POCT GLUCOSE: 110 MG/DL (ref 70–110)
POCT GLUCOSE: 85 MG/DL (ref 70–110)
POTASSIUM SERPL-SCNC: 4.2 MMOL/L (ref 3.5–5.1)
RBC # BLD AUTO: 2.69 M/UL (ref 4.6–6.2)
SODIUM SERPL-SCNC: 141 MMOL/L (ref 136–145)
WBC # BLD AUTO: 7.25 K/UL (ref 3.9–12.7)

## 2022-03-30 PROCEDURE — 90935 HEMODIALYSIS ONE EVALUATION: CPT | Mod: ,,, | Performed by: NURSE PRACTITIONER

## 2022-03-30 PROCEDURE — 84100 ASSAY OF PHOSPHORUS: CPT | Performed by: UROLOGY

## 2022-03-30 PROCEDURE — 63600175 PHARM REV CODE 636 W HCPCS: Performed by: STUDENT IN AN ORGANIZED HEALTH CARE EDUCATION/TRAINING PROGRAM

## 2022-03-30 PROCEDURE — 83735 ASSAY OF MAGNESIUM: CPT | Performed by: UROLOGY

## 2022-03-30 PROCEDURE — 94761 N-INVAS EAR/PLS OXIMETRY MLT: CPT

## 2022-03-30 PROCEDURE — 36415 COLL VENOUS BLD VENIPUNCTURE: CPT | Performed by: UROLOGY

## 2022-03-30 PROCEDURE — 85025 COMPLETE CBC W/AUTO DIFF WBC: CPT | Performed by: UROLOGY

## 2022-03-30 PROCEDURE — 90935 HEMODIALYSIS ONE EVALUATION: CPT

## 2022-03-30 PROCEDURE — 25000003 PHARM REV CODE 250: Performed by: STUDENT IN AN ORGANIZED HEALTH CARE EDUCATION/TRAINING PROGRAM

## 2022-03-30 PROCEDURE — 27200950 HC CAPD SUPPORT

## 2022-03-30 PROCEDURE — 25000003 PHARM REV CODE 250: Performed by: NURSE PRACTITIONER

## 2022-03-30 PROCEDURE — 87340 HEPATITIS B SURFACE AG IA: CPT | Performed by: NURSE PRACTITIONER

## 2022-03-30 PROCEDURE — 11000001 HC ACUTE MED/SURG PRIVATE ROOM

## 2022-03-30 PROCEDURE — 90935 PR HEMODIALYSIS, ONE EVALUATION: ICD-10-PCS | Mod: ,,, | Performed by: NURSE PRACTITIONER

## 2022-03-30 PROCEDURE — 25000003 PHARM REV CODE 250: Performed by: UROLOGY

## 2022-03-30 PROCEDURE — 80048 BASIC METABOLIC PNL TOTAL CA: CPT | Performed by: UROLOGY

## 2022-03-30 PROCEDURE — 63600175 PHARM REV CODE 636 W HCPCS: Performed by: NURSE PRACTITIONER

## 2022-03-30 RX ORDER — PANTOPRAZOLE SODIUM 40 MG/1
40 TABLET, DELAYED RELEASE ORAL DAILY
Status: DISCONTINUED | OUTPATIENT
Start: 2022-03-30 | End: 2022-03-31 | Stop reason: HOSPADM

## 2022-03-30 RX ORDER — HEPARIN SODIUM 5000 [USP'U]/ML
1000 INJECTION, SOLUTION INTRAVENOUS; SUBCUTANEOUS
Status: DISCONTINUED | OUTPATIENT
Start: 2022-03-30 | End: 2022-03-31 | Stop reason: HOSPADM

## 2022-03-30 RX ORDER — BISACODYL 10 MG
10 SUPPOSITORY, RECTAL RECTAL 2 TIMES DAILY
Status: DISCONTINUED | OUTPATIENT
Start: 2022-03-30 | End: 2022-03-31 | Stop reason: HOSPADM

## 2022-03-30 RX ADMIN — HEPARIN SODIUM 5000 UNITS: 5000 INJECTION INTRAVENOUS; SUBCUTANEOUS at 05:03

## 2022-03-30 RX ADMIN — METOCLOPRAMIDE 5 MG: 5 TABLET ORAL at 08:03

## 2022-03-30 RX ADMIN — LOSARTAN POTASSIUM 50 MG: 50 TABLET, FILM COATED ORAL at 02:03

## 2022-03-30 RX ADMIN — CYANOCOBALAMIN TAB 250 MCG 500 MCG: 250 TAB at 08:03

## 2022-03-30 RX ADMIN — HEPARIN SODIUM 5000 UNITS: 5000 INJECTION INTRAVENOUS; SUBCUTANEOUS at 02:03

## 2022-03-30 RX ADMIN — HYDROCHLOROTHIAZIDE 25 MG: 25 TABLET ORAL at 02:03

## 2022-03-30 RX ADMIN — DOXAZOSIN 8 MG: 8 TABLET ORAL at 08:03

## 2022-03-30 RX ADMIN — CYANOCOBALAMIN TAB 250 MCG 500 MCG: 250 TAB at 02:03

## 2022-03-30 RX ADMIN — AMLODIPINE BESYLATE 10 MG: 10 TABLET ORAL at 02:03

## 2022-03-30 RX ADMIN — HEPARIN SODIUM 1000 UNITS: 5000 INJECTION INTRAVENOUS; SUBCUTANEOUS at 12:03

## 2022-03-30 RX ADMIN — CINACALCET 30 MG: 30 TABLET, FILM COATED ORAL at 02:03

## 2022-03-30 RX ADMIN — ATORVASTATIN CALCIUM 20 MG: 20 TABLET, FILM COATED ORAL at 08:03

## 2022-03-30 RX ADMIN — HEPARIN SODIUM 5000 UNITS: 5000 INJECTION INTRAVENOUS; SUBCUTANEOUS at 09:03

## 2022-03-30 RX ADMIN — BISACODYL 10 MG: 10 SUPPOSITORY RECTAL at 08:03

## 2022-03-30 RX ADMIN — PANTOPRAZOLE SODIUM 40 MG: 40 TABLET, DELAYED RELEASE ORAL at 02:03

## 2022-03-30 RX ADMIN — SODIUM CHLORIDE 100 ML/HR: 0.9 INJECTION, SOLUTION INTRAVENOUS at 09:03

## 2022-03-30 NOTE — PLAN OF CARE
Problem: Adult Inpatient Plan of Care  Goal: Plan of Care Review  Outcome: Ongoing, Progressing  Goal: Patient-Specific Goal (Individualized)  Outcome: Ongoing, Progressing  Goal: Absence of Hospital-Acquired Illness or Injury  Outcome: Ongoing, Progressing  Goal: Optimal Comfort and Wellbeing  Outcome: Ongoing, Progressing  Goal: Readiness for Transition of Care  Outcome: Ongoing, Progressing     Problem: Infection  Goal: Absence of Infection Signs and Symptoms  Outcome: Ongoing, Progressing     Problem: Device-Related Complication Risk (Hemodialysis)  Goal: Safe, Effective Therapy Delivery  Outcome: Ongoing, Progressing     Problem: Hemodynamic Instability (Hemodialysis)  Goal: Effective Tissue Perfusion  Outcome: Ongoing, Progressing     Problem: Infection (Hemodialysis)  Goal: Absence of Infection Signs and Symptoms  Outcome: Ongoing, Progressing     Problem: Fall Injury Risk  Goal: Absence of Fall and Fall-Related Injury  Outcome: Ongoing, Progressing     Problem: Skin Injury Risk Increased  Goal: Skin Health and Integrity  Outcome: Ongoing, Progressing

## 2022-03-30 NOTE — PROGRESS NOTES
Dialysis completed. Right IJ tunneled catheter flushed,heparinized, capped and taped. Patient dialyzed for 3.5 hours with fluid removal of 500 ml. Tolerated well with stable vital signs.

## 2022-03-30 NOTE — PROGRESS NOTES
[facility]FF HEMODIALYSIS NOTE     Patient currently on hemodialysis for removal of uremic toxins and volume.     Patient seen and evaluated on hemodialysis, tolerating treatment, see HD flowsheet for vitals and assessments.    Labs have been reviewed and the dialysate bath has been adjusted.       Assessment/Plan:    -Patient seen on HD, tolerating treatment well, w/o complaints   -UF goal of 2-3  -flush PD cath once week- due today   -Renal diet, if not NPO   -Strict I/O's and daily weights  -Daily renal function panels  -Keep MAP >65 while on HD   -Maintain Hgb >7.0  -iron studies in am   -continue cinacalcet     -Will continue to follow while inpatient     Edilma Hunt DNP-FNP, C  Nephrology  Pager: [phone]

## 2022-03-30 NOTE — ASSESSMENT & PLAN NOTE
Kurtis Plata is an 77 y.o. male with a history of ESRD and a left renal mass who underwent left robotic retroperitoneal radical nephrectomy on 3/24/22.    - Continue NGT for now, will get KUB this AM for possible removal   - Aspiration precautions  - Protonix  - Trend H/H - replaced additional 1u pRBC on 3/28, hgb responded appropriately   - EKG 3/26 w/ sinus tachycardia, suspect related to anemia  - PT/OT  - Ambulate qid  - HD per nephrology, appreciate recs  - Drains:    PD catheter - maintain, will hold off on PD for 4 weeks post-surgery   Trialysis - removed 3/26 per nephrology recs   Permacath - placed by IR 3/26, will be primary access for HD on discharge  - Home meds  - Pain control  - Enema BID  - PPx: SCD's/MARYBEL's/IS/SQH    Dispo: remain hospitalized pending return of full bowel function.

## 2022-03-30 NOTE — PROGRESS NOTES
Patient arrived to dialysis unit via wheelchair. Maintenance dialysis started(transition from PD to HD) via right IJ tunneled catheter.

## 2022-03-30 NOTE — PT/OT/SLP PROGRESS
1. Blood tests today    2. Continue Tysabri infusions    3. We discussed problems with lightheadedness and passing out. In addition to iron supplementation, I would also recommend that you try the following (in order):    -Drink two glasses of very cold (ice) water first thing in the morning and drink another glass with any return of symptoms  -Add salt to your diet  -Consider trying an abdominal binder     4. We will refer you to the headache clinic for evaluation and management of chronic headaches    5. Continue clonazepam, modafinil and nortriptyline as you are doing, for now    6. Return to clinic in 6 months   Physical Therapy      Patient Name:  Kurtis Plata   MRN:  [id number]    Patient not seen today secondary to off the floor for dialysis, then diagnostic radiology, third attempt patient declined, family stated patient has walked 2 times today in [place] without difficulty. Will follow-up 3/31/22.

## 2022-03-30 NOTE — PROGRESS NOTES
[facility]  Urology  Progress Note    Patient Name: Kurtis Plata  MRN: [id number]  Admission Date: 3/24/2022  Hospital Length of Stay: 6 days  Code Status: Prior   Attending Provider: Janes Ochoa MD   Primary Care Physician: Anmol Joe MD    Subjective:     HPI:  Kurtis Plata is an 77 y.o. male with a history of ESRD and a left renal mass who underwent left robotic retroperitoneal radical nephrectomy on 3/24/22.      Interval History: NAEON. VSS. NGT output declining, 200cc overnight. Denies n/v. Still passing flatus, no BM's. Was not given the enema overnight as ordered, reordered this AM for BID. Abdomen more soft this AM, walking, PT/OT worked with him yesterday. Will CTM.       Objective:     Temp:  [96.9 °F (36.1 °C)-99.1 °F (37.3 °C)] 99.1 °F (37.3 °C)  Pulse:  [] 94  Resp:  [18] 18  SpO2:  [93 %-99 %] 94 %  BP: (124-175)/(57-79) 124/57     Body mass index is 28.46 kg/m².           Drains       Drain  Duration                  NG/OG Tube 03/28/22 1445 Right nostril 1 day                    Physical Exam  Vitals and nursing note reviewed.   Constitutional:       Appearance: Normal appearance.   HENT:      Head: Normocephalic and atraumatic.      Nose: Nose normal.      Mouth/Throat:      Mouth: Mucous membranes are moist.   Eyes:      General: No scleral icterus.     Extraocular Movements: Extraocular movements intact.      Pupils: Pupils are equal, round, and reactive to light.   Cardiovascular:      Rate and Rhythm: Normal rate.      Comments: Permacath (R chest) in place. Dressing over right neck (former trialysis site).  Pulmonary:      Effort: Pulmonary effort is normal. No respiratory distress.   Abdominal:      General: Abdomen is flat. There is distension.      Tenderness: There is no abdominal tenderness.      Comments: Incisions clean/dry/intact  Abdomen with improved distention not tympanitic, not peritonitic or tender.    PD catheter in place, no erythema, fluctuance, or  induration surrounding insertion site   Musculoskeletal:         General: Normal range of motion.      Cervical back: Normal range of motion.   Skin:     General: Skin is warm and dry.   Neurological:      General: No focal deficit present.      Mental Status: He is alert and oriented to person, place, and time. Mental status is at baseline.   Psychiatric:         Mood and Affect: Mood normal.         Behavior: Behavior normal.         Thought Content: Thought content normal.         Judgment: Judgment normal.       Significant Labs:    BMP:  Recent Labs   Lab 03/28/22 0325 03/29/22 0327 03/30/22  0411    140 141   K 4.9 4.3 4.2    104 107   CO2 17* 19* 20*   BUN 37* 28* 44*   CREATININE 7.8* 5.6* 7.8*   CALCIUM 8.9 8.9 9.2       CBC:   Recent Labs   Lab 03/28/22 0325 03/29/22 0327 03/30/22  0411   WBC 7.08 7.53 7.25   HGB 6.6* 7.7* 7.6*   HCT 20.4* 23.6* 23.5*    174 193       All pertinent labs results from the past 24 hours have been reviewed.    Significant Imaging:  All pertinent imaging results/findings from the past 24 hours have been reviewed.                    Assessment/Plan:     * Renal mass, left  Kurtis Plata is an 77 y.o. male with a history of ESRD and a left renal mass who underwent left robotic retroperitoneal radical nephrectomy on 3/24/22.    - Continue NGT for now, will get KUB this AM for possible removal   - Aspiration precautions  - Protonix  - Trend H/H - replaced additional 1u pRBC on 3/28, hgb responded appropriately   - EKG 3/26 w/ sinus tachycardia, suspect related to anemia  - PT/OT  - Ambulate qid  - HD per nephrology, appreciate recs  - Drains:    PD catheter - maintain, will hold off on PD for 4 weeks post-surgery   Trialysis - removed 3/26 per nephrology recs   Permacath - placed by IR 3/26, will be primary access for HD on discharge  - Home meds  - Pain control  - Enema BID  - PPx: SCD's/MARYBEL's/IS/SQH    Dispo: remain hospitalized pending return of full  bowel function.        VTE Risk Mitigation (From admission, onward)         Ordered     heparin (porcine) injection 1,000 Units  As needed (PRN)         03/28/22 1216     heparin (porcine) injection 5,000 Units  Every 8 hours         03/24/22 2498                Mode Castillo MD  Urology  [facility] - Surgery

## 2022-03-30 NOTE — SUBJECTIVE & OBJECTIVE
Interval History: NAEON. VSS. NGT output declining, 200cc overnight. Denies n/v. Still passing flatus, no BM's. Was not given the enema overnight as ordered, reordered this AM for BID. Abdomen more soft this AM, walking, PT/OT worked with him yesterday. Will CTM.       Objective:     Temp:  [96.9 °F (36.1 °C)-99.1 °F (37.3 °C)] 99.1 °F (37.3 °C)  Pulse:  [] 94  Resp:  [18] 18  SpO2:  [93 %-99 %] 94 %  BP: (124-175)/(57-79) 124/57     Body mass index is 28.46 kg/m².           Drains       Drain  Duration                  NG/OG Tube 03/28/22 1445 Right nostril 1 day                    Physical Exam  Vitals and nursing note reviewed.   Constitutional:       Appearance: Normal appearance.   HENT:      Head: Normocephalic and atraumatic.      Nose: Nose normal.      Mouth/Throat:      Mouth: Mucous membranes are moist.   Eyes:      General: No scleral icterus.     Extraocular Movements: Extraocular movements intact.      Pupils: Pupils are equal, round, and reactive to light.   Cardiovascular:      Rate and Rhythm: Normal rate.      Comments: Permacath (R chest) in place. Dressing over right neck (former trialysis site).  Pulmonary:      Effort: Pulmonary effort is normal. No respiratory distress.   Abdominal:      General: Abdomen is flat. There is distension.      Tenderness: There is no abdominal tenderness.      Comments: Incisions clean/dry/intact  Abdomen with improved distention not tympanitic, not peritonitic or tender.    PD catheter in place, no erythema, fluctuance, or induration surrounding insertion site   Musculoskeletal:         General: Normal range of motion.      Cervical back: Normal range of motion.   Skin:     General: Skin is warm and dry.   Neurological:      General: No focal deficit present.      Mental Status: He is alert and oriented to person, place, and time. Mental status is at baseline.   Psychiatric:         Mood and Affect: Mood normal.         Behavior: Behavior normal.          Thought Content: Thought content normal.         Judgment: Judgment normal.       Significant Labs:    BMP:  Recent Labs   Lab 03/28/22 0325 03/29/22 0327 03/30/22  0411    140 141   K 4.9 4.3 4.2    104 107   CO2 17* 19* 20*   BUN 37* 28* 44*   CREATININE 7.8* 5.6* 7.8*   CALCIUM 8.9 8.9 9.2       CBC:   Recent Labs   Lab 03/28/22 0325 03/29/22 0327 03/30/22  0411   WBC 7.08 7.53 7.25   HGB 6.6* 7.7* 7.6*   HCT 20.4* 23.6* 23.5*    174 193       All pertinent labs results from the past 24 hours have been reviewed.    Significant Imaging:  All pertinent imaging results/findings from the past 24 hours have been reviewed.

## 2022-03-30 NOTE — PROGRESS NOTES
Peritoneal Dialysis: 500 ml of 1.5% Dianeal instilled in patient for in and out  flush of  PD catheter as ordered using aseptic technique. Patient drained out  500 ml. PD catheter capped with betadine minicap. No pain or discomfort voiced during fill or drain. Small amount of fibrin noted in drain bag. Sterile dressing change to PD catheter exit site. Catheter site clean, dry without redness or drainage.

## 2022-03-31 VITALS
RESPIRATION RATE: 18 BRPM | TEMPERATURE: 98 F | DIASTOLIC BLOOD PRESSURE: 67 MMHG | BODY MASS INDEX: 28.49 KG/M2 | HEART RATE: 82 BPM | HEIGHT: 65 IN | WEIGHT: 171 LBS | SYSTOLIC BLOOD PRESSURE: 138 MMHG | OXYGEN SATURATION: 96 %

## 2022-03-31 PROBLEM — N28.89 RENAL MASS, LEFT: Status: RESOLVED | Noted: 2021-12-20 | Resolved: 2022-03-31

## 2022-03-31 LAB
HBV SURFACE AG SERPL QL IA: NEGATIVE
POCT GLUCOSE: 109 MG/DL (ref 70–110)
POCT GLUCOSE: 110 MG/DL (ref 70–110)
POCT GLUCOSE: 110 MG/DL (ref 70–110)

## 2022-03-31 PROCEDURE — 25000003 PHARM REV CODE 250: Performed by: UROLOGY

## 2022-03-31 PROCEDURE — 94761 N-INVAS EAR/PLS OXIMETRY MLT: CPT

## 2022-03-31 PROCEDURE — 97116 GAIT TRAINING THERAPY: CPT

## 2022-03-31 PROCEDURE — 99900035 HC TECH TIME PER 15 MIN (STAT)

## 2022-03-31 PROCEDURE — 63600175 PHARM REV CODE 636 W HCPCS: Performed by: STUDENT IN AN ORGANIZED HEALTH CARE EDUCATION/TRAINING PROGRAM

## 2022-03-31 PROCEDURE — 25000003 PHARM REV CODE 250: Performed by: STUDENT IN AN ORGANIZED HEALTH CARE EDUCATION/TRAINING PROGRAM

## 2022-03-31 RX ORDER — POLYETHYLENE GLYCOL 3350 17 G/17G
17 POWDER, FOR SOLUTION ORAL DAILY
Qty: 510 G | Refills: 0 | Status: SHIPPED | OUTPATIENT
Start: 2022-03-31 | End: 2022-04-30

## 2022-03-31 RX ORDER — DEXTROMETHORPHAN HYDROBROMIDE, GUAIFENESIN 5; 100 MG/5ML; MG/5ML
650 LIQUID ORAL EVERY 8 HOURS
Qty: 90 TABLET | Refills: 0 | Status: SHIPPED | OUTPATIENT
Start: 2022-03-31 | End: 2022-04-27

## 2022-03-31 RX ORDER — OXYCODONE HYDROCHLORIDE 5 MG/1
5 TABLET ORAL EVERY 6 HOURS PRN
Qty: 10 TABLET | Refills: 0 | Status: SHIPPED | OUTPATIENT
Start: 2022-03-31 | End: 2022-04-10

## 2022-03-31 RX ORDER — SODIUM CHLORIDE 9 MG/ML
INJECTION, SOLUTION INTRAVENOUS ONCE
Status: DISCONTINUED | OUTPATIENT
Start: 2022-04-01 | End: 2022-03-31 | Stop reason: HOSPADM

## 2022-03-31 RX ADMIN — LOSARTAN POTASSIUM 50 MG: 50 TABLET, FILM COATED ORAL at 09:03

## 2022-03-31 RX ADMIN — PANTOPRAZOLE SODIUM 40 MG: 40 TABLET, DELAYED RELEASE ORAL at 09:03

## 2022-03-31 RX ADMIN — AMLODIPINE BESYLATE 10 MG: 10 TABLET ORAL at 09:03

## 2022-03-31 RX ADMIN — HYDROCHLOROTHIAZIDE 25 MG: 25 TABLET ORAL at 09:03

## 2022-03-31 RX ADMIN — CINACALCET 30 MG: 30 TABLET, FILM COATED ORAL at 09:03

## 2022-03-31 RX ADMIN — CYANOCOBALAMIN TAB 250 MCG 500 MCG: 250 TAB at 09:03

## 2022-03-31 RX ADMIN — HEPARIN SODIUM 5000 UNITS: 5000 INJECTION INTRAVENOUS; SUBCUTANEOUS at 06:03

## 2022-03-31 NOTE — PT/OT/SLP PROGRESS
Physical Therapy Treatment    Patient Name:  Kurtis Plata   MRN:  [id number]    Recommendations:     Discharge Recommendations:  home with home health   Discharge Equipment Recommendations: walker, rolling   Barriers to discharge: None    Assessment:     Kurits Plata is a 77 y.o. male admitted with a medical diagnosis of Renal mass, left.  He presents with the following impairments/functional limitations:  weakness, impaired endurance, impaired self care skills, impaired functional mobilty, gait instability, impaired balance, decreased lower extremity function. Kurtis Plata would benefit from acute PT intervention to address listed functional deficits, provide patient/caregiver education, reduce fall risk, and maximize (I) and safety with functional mobility.    Pt continues to make progress towards goals and was participative and motivated throughout session. Pt ambulated ~150 ft with CGA and HHA, but with mild-moderate instability noted making patient a fall risk. Pt will continue to benefit from acute PT services in order to maximize safety and (I) with functional mobility.    After hospital discharge, pt would benefit from HHPT to continue addressing therapy impairments.    Rehab Prognosis: Good; patient would benefit from acute skilled PT services to address these deficits and reach maximum level of function.      Plan:     During this hospitalization, patient to be seen 4 x/week to address the identified rehab impairments via gait training, therapeutic activities, therapeutic exercises, neuromuscular re-education and progress toward the following goals:    · Plan of Care Expires:  04/28/22    This plan of care has been discussed with the patient/caregiver, who was included in its development and is in agreement with the identified goals and treatment plan.     Subjective     Communicated with RN prior to session.  Patient agreeable to participate.     Chief Complaint: none  Patient/Family  Comments/goals: to go home today  Pt pain prior to session: 0/10  Pt pain following session: 0/10    Objective:     Patient found HOB elevated with Other (comments) (no active lines)  upon PT entry to room.    General Precautions: Standard, fall   Orthopedic Precautions:N/A   Braces: N/A     Functional Mobility:    Bed Mobility:  · Supine to Sit: Stand-by Assistance  · Sit to Supine: Stand-by Assistance  · Scooting anteriorly to EOB to plant feet on floor: Stand-by Assistance    Transfers:   · Sit to Stand Transfer: Stand-by Assistance  from bedside chair with no AD             Gait:  · Patient received gait training ~150 feet with Contact Guard Assistance and HHA   · PT with one hand on gait gelt and other hand holding Pt's L hand  · Gait Assessment: decreased speed, step length, swing through, heel strike, forward flexed posture, downward gaze, and narrow GUILLERMO with occasional scissoring during initial portion of gait  · PT providing verbal and tactile cues for improved upright posture, gaze direction, gait fluidity, and widened GUILLERMO    Balance:  · Static Sit: Stand-By Assist at EOB x ~2 minutes  · Static Stand: Contact-Guard Assist with Hand-held assist x 1      Therapeutic Activities/Exercises     Patient educated on the importance of early mobility to prevent functional decline during hospital stay    Patient was instructed to utilize staff assistance for mobility/transfers.  Patient was educated on PT POC and all questions answered within PT scope of practice.    Patient/caregiver able to verbalize understanding; will follow-up with pt/caregiver during current admit for additional questions/concerns within scope of practice.     AM-PAC 6 CLICK MOBILITY  Total Score:19     Patient left HOB elevated with call button in reach and daughters present.        History/Goals:     PAST MEDICAL HISTORY:  Past Medical History:   Diagnosis Date    Anemia     BPH (benign prostatic hyperplasia)     CKD (chronic kidney  disease)     Colon polyps     Dialysis patient     daily peritoneal    Dysphagia     Esophagitis     ESRD (end stage renal disease)     Gallbladder sludge     Gastritis     Gastrointestinal stromal tumor (GIST)     GERD (gastroesophageal reflux disease)     Gout     Hiatal hernia     HTN (hypertension)     Hyperlipidemia     Iron deficiency     Nephrosclerosis     Overweight     Proteinuria     Renal cysts, acquired, bilateral     Sleep apnea     Vitamin D deficiency        Past Surgical History:   Procedure Laterality Date    COLONOSCOPY W/ POLYPECTOMY  2017    ENDOSCOPIC ULTRASOUND OF UPPER GASTROINTESTINAL TRACT N/A 2021    Procedure: ULTRASOUND, UPPER GI TRACT, ENDOSCOPIC;  Surgeon: Anmol Joe MD;  Location: [facility] (2ND FLR);  Service: Endoscopy;  Laterality: N/A;  Covid-19 test 21 at [place] - pg  Serum K+ before procedure (dialysis patient) - pg    ESOPHAGOGASTRODUODENOSCOPY  2021    LAPAROSCOPIC ROBOT-ASSISTED SURGICAL REMOVAL OF KIDNEY USING DA TERRI XI Left 3/24/2022    Procedure: XI ROBOTIC NEPHRECTOMY/ RETROPERITONEAL;  Surgeon: Janes Ochoa MD;  [facility];  Service: Urology;  Laterality: Left;  4HR/ GEN WITH REGIONAL    PERITONEAL CATHETER INSERTION      RENAL BIOPSY  2017    VASECTOMY         GOALS:   Multidisciplinary Problems     Physical Therapy Goals        Problem: Physical Therapy    Goal Priority Disciplines Outcome Goal Variances Interventions   Physical Therapy Goal     PT, PT/OT Ongoing, Progressing     Description: Goals to be met by: 22    Patient will increase functional independence with mobility by performin. Supine to sit with Stand-by Assistance  2. Sit to supine with Stand-by Assistance  3. Sit to stand transfer with Contact Guard Assistance  4. Gait  x 150 feet with Stand-by Assistance using least restrictive assistive device.   5.  Patient will demonstrate independence with a home exercise  program  6. Patient's balance will be GOOD: Needs SUPERVISION only during gait and able to self right with moderate LOB.  7. Ascend/descend 4 stair with bilateral Handrails Contact Guard Assistance using No Assistive Device.                    Time Tracking:     PT Received On: 03/31/22  PT Start Time: 1348     PT Stop Time: 1400  PT Total Time (min): 12 min     Billable Minutes: Gait Training 10      Fely Hayes, PT  03/31/2022

## 2022-03-31 NOTE — SUBJECTIVE & OBJECTIVE
Interval History: NAEON. VSS. Flatus and BM x2 overnight, no n/v. Tolerating full liquid diet and ambulating halls. Will CTM for possible d/c today with HH if tolerating full diet with continual improvement.       Objective:     Temp:  [96.7 °F (35.9 °C)-99.4 °F (37.4 °C)] 98.8 °F (37.1 °C)  Pulse:  [] 86  Resp:  [18-19] 18  SpO2:  [93 %-97 %] 93 %  BP: (122-163)/(60-79) 122/68     Body mass index is 28.46 kg/m².           Drains       Drain  Duration                  Hemodialysis Catheter right internal jugular -- days         NG/OG Tube 03/28/22 1445 Right nostril 2 days                    Physical Exam  Vitals and nursing note reviewed.   Constitutional:       Appearance: Normal appearance.   HENT:      Head: Normocephalic and atraumatic.      Nose: Nose normal.      Mouth/Throat:      Mouth: Mucous membranes are moist.   Eyes:      General: No scleral icterus.     Extraocular Movements: Extraocular movements intact.      Pupils: Pupils are equal, round, and reactive to light.   Cardiovascular:      Rate and Rhythm: Normal rate.      Comments: Permacath (R chest) in place. Dressing over right neck (former trialysis site).  Pulmonary:      Effort: Pulmonary effort is normal. No respiratory distress.   Abdominal:      General: Abdomen is flat. There is distension.      Tenderness: There is no abdominal tenderness.      Comments: Incisions clean/dry/intact  Abdomen with improved distention not tympanitic, not peritonitic or tender.    PD catheter in place, no erythema, fluctuance, or induration surrounding insertion site   Musculoskeletal:         General: Normal range of motion.      Cervical back: Normal range of motion.   Skin:     General: Skin is warm and dry.   Neurological:      General: No focal deficit present.      Mental Status: He is alert and oriented to person, place, and time. Mental status is at baseline.   Psychiatric:         Mood and Affect: Mood normal.         Behavior: Behavior normal.          Thought Content: Thought content normal.         Judgment: Judgment normal.       Significant Labs:    BMP:  Recent Labs   Lab 03/28/22 0325 03/29/22 0327 03/30/22  0411    140 141   K 4.9 4.3 4.2    104 107   CO2 17* 19* 20*   BUN 37* 28* 44*   CREATININE 7.8* 5.6* 7.8*   CALCIUM 8.9 8.9 9.2       CBC:   Recent Labs   Lab 03/28/22 0325 03/29/22 0327 03/30/22  0411   WBC 7.08 7.53 7.25   HGB 6.6* 7.7* 7.6*   HCT 20.4* 23.6* 23.5*    174 193       All pertinent labs results from the past 24 hours have been reviewed.    Significant Imaging:  All pertinent imaging results/findings from the past 24 hours have been reviewed.

## 2022-03-31 NOTE — PLAN OF CARE
[facility]    HOME HEALTH ORDERS  FACE TO FACE ENCOUNTER    Patient Name: Kurtis Plata  YOB: 1945    PCP: Anmol Joe MD   [address] [place] [address]  PCP Phone Number: [phone]  PCP Fax: [phone]    Encounter Date: 03/31/2022    Admit to Home Health    Diagnoses:  Active Hospital Problems    Diagnosis  POA    *Renal mass, left [N28.89]  Yes    ESRD on peritoneal dialysis [N18.6, Z99.2]  Not Applicable      Resolved Hospital Problems   No resolved problems to display.       Future Appointments   Date Time Provider Department Center   4/20/2022 10:30 AM Janes Ochoa MD [facility] UROLOG [facility]      Follow-up Information     Edgar Brady MD Follow up.    Specialty: Family Medicine  Why: Hospital follow-up scheduled for 3/31/22 @ 915AM  Contact information:  [address]  [facility] [address]  [phone]             Janes Ochoa MD Follow up on 4/20/2022.    Specialty: Urology  Why: Post-op  Contact information:  [address]  [facility] [address]  [phone]                           I have seen and examined this patient face to face today. My clinical findings that support the need for the home health skilled services and home bound status are the following:  Weakness/numbness causing balance and gait disturbance due to Surgery making it taxing to leave home.  Requiring assistive device to leave home due to unsteady gait caused by Surgery.    Allergies:Review of patient's allergies indicates:  No Known Allergies    Diet: regular diet    Activities: activity as tolerated    Nursing:   SN to complete comprehensive assessment including routine vital signs. Instruct on disease process and s/s of complications to report to MD. Review/verify medication list sent home with the patient at time of discharge  and instruct patient/caregiver as needed. Frequency may be adjusted depending on start of care date. If patient has enteral  feeding tube (NG, PEG, J-tube, G-tube), flush tube before and after feeding and/or medication administration with 20-30 mL of water.    Notify MD if SBP > 160 or < 90; DBP > 90 or < 50; HR > 120 or < 50; Temp > 101       CONSULTS:    Physical Therapy to evaluate and treat. Evaluate for home safety and equipment needs; Establish/upgrade home exercise program. Perform / instruct on therapeutic exercises, gait training, transfer training, and Range of Motion.  Occupational Therapy to evaluate and treat. Evaluate home environment for safety and equipment needs. Perform/Instruct on transfers, ADL training, ROM, and therapeutic exercises.    MISCELLANEOUS CARE:  N/A    WOUND CARE ORDERS  no      Medications: Review discharge medications with patient and family and provide education.      Current Discharge Medication List      CONTINUE these medications which have NOT CHANGED    Details   allopurinoL (ZYLOPRIM) 100 MG tablet Take 150 mg by mouth once daily.      amLODIPine (NORVASC) 10 MG tablet Take 10 mg by mouth once daily.      atorvastatin (LIPITOR) 40 MG tablet Take 20 mg by mouth every evening.      cinacalcet (SENSIPAR) 30 MG Tab TAKE ONE TABLET BY MOUTH DAILY FOR CHRONIC KIDNEY DISEASE. TAKE WITH FOOD.      cyanocobalamin 500 MCG tablet Take 500 mcg by mouth 2 (two) times a day.      doxazosin (CARDURA) 8 MG Tab Take 8 mg by mouth every evening.      ergocalciferol (ERGOCALCIFEROL) 50,000 unit Cap Take 50,000 Units by mouth every 14 (fourteen) days.      ferrous sulfate (FEOSOL) 325 mg (65 mg iron) Tab tablet Take 325 mg by mouth 2 (two) times daily.      finasteride (PROSCAR) 5 mg tablet Take 5 mg by mouth once daily.      gentamicin (GARAMYCIN) 0.1 % cream APPLY SMALL AMOUNT TOPICALLY DAILY FOR INFECTION      hydroCHLOROthiazide (HYDRODIURIL) 25 MG tablet Take 25 mg by mouth once daily.      lactulose (CHRONULAC) 10 gram/15 mL solution TAKE 20GM/30ML BY MOUTH EVERY 6 HOURS AS NEEDED FOR CONSTIPATION * TAKE 2  TABLESPOONFULS AS DIRECTED *      losartan (COZAAR) 100 MG tablet Take 50 mg by mouth once daily.      magnesium oxide (MAG-OX) 400 mg (241.3 mg magnesium) tablet TAKE ONE TABLET BY MOUTH DAILY AS A MAGNESIUM SUPPLEMENT      metoclopramide HCl (REGLAN) 5 MG tablet TAKE ONE TABLET BY MOUTH EVERY NIGHT AT BEDTIME FOR STOMACH      potassium chloride (KLOR-CON) 20 mEq Pack TAKE 2 PACKETS BY MOUTH TWICE A DAY AS A POTASSIUM SUPPLEMENT TAKE TWICE A DAY FOR 5 DAYS AND THEN ONE A DAY AFTERWARDS WITH YOUT HCTZ      vitamin D (VITAMIN D3) 1000 units Tab Take 1,000 Units by mouth once daily.      darbepoetin lillian in polysorbat (ARANESP, IN POLYSORBATE,) 10 mcg/0.4 mL Syrg Inject as directed. Monthly.      tamsulosin (FLOMAX) 0.4 mg Cap Take 0.4 mg by mouth once daily.             I certify that this patient is confined to his home and needs intermittent skilled nursing care, physical therapy and occupational therapy.

## 2022-03-31 NOTE — DISCHARGE SUMMARY
[facility]  Urology  Discharge Summary      Patient Name: Kurtis Plata  MRN: [id number]  Admission Date: 3/24/2022  Hospital Length of Stay: 7 days  Discharge Date and Time: 3/31/2022  4:10 PM  Attending Physician: Janes Ochoa MD   Discharging Provider: Mode Castillo MD  Primary Care Physician: Anmol Joe MD    HPI:   Kurtis Plata is an 77 y.o. male with a history of ESRD and a left renal mass who underwent left robotic retroperitoneal radical nephrectomy on 3/24/22.       Procedure(s) (LRB):  XI ROBOTIC NEPHRECTOMY/ RETROPERITONEAL (Left)     Indwelling Lines/Drains at time of discharge:   Lines/Drains/Airways     Central Venous Catheter Line  Duration                Hemodialysis Catheter right internal jugular -- days          Drain  Duration                NG/OG Tube 03/28/22 1445 Right nostril 2 days                Hospital Course (synopsis of major diagnoses, care, treatment, and services provided during the course of the hospital stay):    The patient was admitted to [facility] [place] the above procedure. Patient tolerated the procedure well in its entirety without issue. For more details, please refer to the complete operative note. He was transferred to recovery post-op and then to the floor. Nephrology was consulted on POD0 to manage his ESRD with HD. POD1, the patient ambulated and was tolerating an oral diet with adequate pain management. IR was consulted for permcath placement for continued HD needs. POD2, the patient continued to ambulate. Hgb was noted to have a drop to 6.5 from 7.5, was given 1u pRBC. On POD3, patient had a large bilious emesis. KUB showed bowel dilation concerning for SBO vs. Ileus. Patient was not having BM's or passing flatus, suppositories given and NGT placed. POD4, abdominal exam was worsening with tympany. NGT was replaced after being pulled out by patient overnight. On POD 5, abdomen softened and patient had started passing flatus. POD6, patient's diet was  advanced to clears and NGT had been removed. On POD7, patient was tolerating a renal diet, ambulating halls, pain controlled, and physical exam was appropriate for post operative state. The incisions were clean, dry and intact. The patient was deemed stable for discharge on 03/31/2022.      Medications and instructions as below.  For more thorough information, please refer to the hospital record and operative report.    Consults:   Consults (From admission, onward)        Status Ordering Provider     Inpatient consult to Midline team  Once        Provider:  (Not yet assigned)    Completed JANES HILLS     Inpatient consult to Interventional Radiology  Once        Provider:  (Not yet assigned)    Completed RODRIGUEZ CHIN     Inpatient consult to Nephrology  Once        Provider:  (Not yet assigned)    Completed YAZMIN LONDON     IP consult to Interventional Nephrology  Once        Provider:  (Not yet assigned)    Completed YAZMIN LONDON          Significant Diagnostic Studies:     Pending Diagnostic Studies:     Procedure Component Value Units Date/Time    Specimen to Pathology, Surgery Urology [[id number]] Collected: 03/24/22 1220    Order Status: Sent Lab Status: In process Updated: 03/24/22 1253          Final Active Diagnoses:    Diagnosis Date Noted POA    ESRD on peritoneal dialysis [N18.6, Z99.2] 04/20/2021 Not Applicable      Problems Resolved During this Admission:    Diagnosis Date Noted Date Resolved POA    PRINCIPAL PROBLEM:  Renal mass, left [N28.89] 12/20/2021 03/31/2022 Yes       Discharged Condition: good    Disposition: Home or Self Care    Follow Up:   Follow-up Information     Janes Hills MD Follow up on 4/20/2022.    Specialty: Urology  Why: Post-op  Contact information:  [address] [facility] [address]  [phone]             Bessy Puri MD Follow up.    Specialty: Internal Medicine  Why: Hospital follow-up scheduled for 4/7/22 @ 1030AM  Contact information:  [address]  [facility] [address]  [phone]             [facility] Follow up.    Specialties: DME Provider, Home Health Services  Contact information:  [address] [place] [address] [phone]                       Patient Instructions:      WHEELCHAIR FOR HOME USE     Order Specific Question Answer Comments   Hours in W/C per day: 8    Type of Wheelchair: Standard    Size(Width): 18"(STD adult)    Leg Support: STD footrests    Lap Belt: Velcro    Accessories: Front brakes    Cushion: Basic    Height: 5' 5" (1.651 m)    Weight: 77.6 kg (171 lb)    Does patient have medical equipment at home? shower chair    Length of need (1-99 months): 99    Please check all that apply: Caregiver is capable and willing to operate wheelchair safely.      CBC Without Differential   Standing Status: Future Number of Occurrences: 1 Standing Exp. Date: 04/09/23     Comprehensive Metabolic Panel   Standing Status: Future Number of Occurrences: 1 Standing Exp. Date: 04/09/23     Lifting restrictions   Order Comments: Do not drive while taking pain medications. No strenuous activity or lifting greater than 10 pounds for 4 weeks.     No dressing needed   Order Comments: Do not soak incisions in tub or bath and do not scrub incisions. You may shower over incisions. If you have surgical glue in place, the glue will come off over the next few weeks.     Notify your health care provider if you experience any of the following:  temperature >100.4     Notify your health care provider if you experience any of the following:  persistent nausea and vomiting or diarrhea     Notify your health care provider if you experience any of the following:  severe uncontrolled pain     Notify your health care provider if you experience any of the following:  redness, tenderness, or signs of infection (pain, swelling, redness, odor or green/yellow discharge around incision site)     Notify your health care provider if you " experience any of the following:  difficulty breathing or increased cough     Notify your health care provider if you experience any of the following:  persistent dizziness, light-headedness, or visual disturbances     EKG 12-lead   Standing Status: Future Number of Occurrences: 1 Standing Exp. Date: 02/08/23     Order Specific Question Answer Comments   Diagnosis Hypertension [191537]      Type & Screen   Standing Status: Future Number of Occurrences: 1 Standing Exp. Date: 04/09/23     Medications:  Reconciled Home Medications:      Medication List      START taking these medications    8 HOUR PAIN RELIEVER 650 MG Tbsr  Generic drug: acetaminophen  Take 1 tablet (650 mg total) by mouth every 8 (eight) hours.     oxyCODONE 5 MG immediate release tablet  Commonly known as: ROXICODONE  Take 1 tablet (5 mg total) by mouth every 6 (six) hours as needed for Pain.     polyethylene glycol 17 gram/dose powder  Commonly known as: GLYCOLAX  Mix one capful (17 grams) into liquid and take by mouth once daily.        CONTINUE taking these medications    allopurinoL 100 MG tablet  Commonly known as: ZYLOPRIM  Take 150 mg by mouth once daily.     amLODIPine 10 MG tablet  Commonly known as: NORVASC  Take 10 mg by mouth once daily.     ARANESP (IN POLYSORBATE) 10 mcg/0.4 mL Syrg  Generic drug: darbepoetin lillian in polysorbat  Inject as directed. Monthly.     atorvastatin 40 MG tablet  Commonly known as: LIPITOR  Take 20 mg by mouth every evening.     cinacalcet 30 MG Tab  Commonly known as: SENSIPAR  TAKE ONE TABLET BY MOUTH DAILY FOR CHRONIC KIDNEY DISEASE. TAKE WITH FOOD.     cyanocobalamin 500 MCG tablet  Take 500 mcg by mouth 2 (two) times a day.     doxazosin 8 MG Tab  Commonly known as: CARDURA  Take 8 mg by mouth every evening.     ergocalciferol 50,000 unit Cap  Commonly known as: ERGOCALCIFEROL  Take 50,000 Units by mouth every 14 (fourteen) days.     ferrous sulfate 325 mg (65 mg iron) Tab tablet  Commonly known as:  FEOSOL  Take 325 mg by mouth 2 (two) times daily.     finasteride 5 mg tablet  Commonly known as: PROSCAR  Take 5 mg by mouth once daily.     gentamicin 0.1 % cream  Commonly known as: GARAMYCIN  APPLY SMALL AMOUNT TOPICALLY DAILY FOR INFECTION     hydroCHLOROthiazide 25 MG tablet  Commonly known as: HYDRODIURIL  Take 25 mg by mouth once daily.     lactulose 10 gram/15 mL solution  Commonly known as: CHRONULAC  TAKE 20GM/30ML BY MOUTH EVERY 6 HOURS AS NEEDED FOR CONSTIPATION * TAKE 2 TABLESPOONFULS AS DIRECTED *     losartan 100 MG tablet  Commonly known as: COZAAR  Take 50 mg by mouth once daily.     magnesium oxide 400 mg (241.3 mg magnesium) tablet  Commonly known as: MAG-OX  TAKE ONE TABLET BY MOUTH DAILY AS A MAGNESIUM SUPPLEMENT     metoclopramide HCl 5 MG tablet  Commonly known as: REGLAN  TAKE ONE TABLET BY MOUTH EVERY NIGHT AT BEDTIME FOR STOMACH     potassium chloride 20 mEq Pack  Commonly known as: KLOR-CON  TAKE 2 PACKETS BY MOUTH TWICE A DAY AS A POTASSIUM SUPPLEMENT TAKE TWICE A DAY FOR 5 DAYS AND THEN ONE A DAY AFTERWARDS WITH YOUT HCTZ     tamsulosin 0.4 mg Cap  Commonly known as: FLOMAX  Take 0.4 mg by mouth once daily.     vitamin D 1000 units Tab  Commonly known as: VITAMIN D3  Take 1,000 Units by mouth once daily.            Time spent on the discharge of patient: 15 minutes    Mode Castillo MD  Urology  [facility] - Surgery

## 2022-03-31 NOTE — NURSING
Patient discharged; rolled off floor by transport via wheelchair. Both daughters accompanied patient with all belongings, patient verbalized understanding of discharge,VSS, no s/s of distress, currentlly denies pain. Please refer to flowsheets and charts for more information.

## 2022-03-31 NOTE — PROGRESS NOTES
[facility]  Urology  Progress Note    Patient Name: Kurtis Plata  MRN: [id number]  Admission Date: 3/24/2022  Hospital Length of Stay: 7 days  Code Status: Prior   Attending Provider: Janes Ochoa MD   Primary Care Physician: Anmol Joe MD    Subjective:     HPI:  Kurtis Plata is an 77 y.o. male with a history of ESRD and a left renal mass who underwent left robotic retroperitoneal radical nephrectomy on 3/24/22.      Interval History: NAEON. VSS. Flatus and BM x2 overnight, no n/v. Tolerating full liquid diet and ambulating halls. Will CTM for possible d/c today with HH if tolerating full diet with continual improvement.       Objective:     Temp:  [96.7 °F (35.9 °C)-99.4 °F (37.4 °C)] 98.8 °F (37.1 °C)  Pulse:  [] 86  Resp:  [18-19] 18  SpO2:  [93 %-97 %] 93 %  BP: (122-163)/(60-79) 122/68     Body mass index is 28.46 kg/m².           Drains       Drain  Duration                  Hemodialysis Catheter right internal jugular -- days         NG/OG Tube 03/28/22 1445 Right nostril 2 days                    Physical Exam  Vitals and nursing note reviewed.   Constitutional:       Appearance: Normal appearance.   HENT:      Head: Normocephalic and atraumatic.      Nose: Nose normal.      Mouth/Throat:      Mouth: Mucous membranes are moist.   Eyes:      General: No scleral icterus.     Extraocular Movements: Extraocular movements intact.      Pupils: Pupils are equal, round, and reactive to light.   Cardiovascular:      Rate and Rhythm: Normal rate.      Comments: Permacath (R chest) in place. Dressing over right neck (former trialysis site).  Pulmonary:      Effort: Pulmonary effort is normal. No respiratory distress.   Abdominal:      General: Abdomen is flat. There is distension.      Tenderness: There is no abdominal tenderness.      Comments: Incisions clean/dry/intact  Abdomen with improved distention not tympanitic, not peritonitic or tender.    PD catheter in place, no erythema,  fluctuance, or induration surrounding insertion site   Musculoskeletal:         General: Normal range of motion.      Cervical back: Normal range of motion.   Skin:     General: Skin is warm and dry.   Neurological:      General: No focal deficit present.      Mental Status: He is alert and oriented to person, place, and time. Mental status is at baseline.   Psychiatric:         Mood and Affect: Mood normal.         Behavior: Behavior normal.         Thought Content: Thought content normal.         Judgment: Judgment normal.       Significant Labs:    BMP:  Recent Labs   Lab 03/28/22 0325 03/29/22 0327 03/30/22  0411    140 141   K 4.9 4.3 4.2    104 107   CO2 17* 19* 20*   BUN 37* 28* 44*   CREATININE 7.8* 5.6* 7.8*   CALCIUM 8.9 8.9 9.2       CBC:   Recent Labs   Lab 03/28/22 0325 03/29/22 0327 03/30/22  0411   WBC 7.08 7.53 7.25   HGB 6.6* 7.7* 7.6*   HCT 20.4* 23.6* 23.5*    174 193       All pertinent labs results from the past 24 hours have been reviewed.    Significant Imaging:  All pertinent imaging results/findings from the past 24 hours have been reviewed.                    Assessment/Plan:     * Renal mass, left  Kurtis Plata is an 77 y.o. male with a history of ESRD and a left renal mass who underwent left robotic retroperitoneal radical nephrectomy on 3/24/22.    - NGT removed, no n/v, tolerating clears  - Aspiration precautions  - Protonix  - Trend H/H - replaced additional 1u pRBC on 3/28, hgb responded appropriately   - EKG 3/26 w/ sinus tachycardia, suspect related to anemia  - PT/OT  - Ambulate qid  - HD per nephrology, appreciate recs  - Drains:    PD catheter - maintain, will hold off on PD for 4 weeks post-surgery   Trialysis - removed 3/26 per nephrology recs   Permacath - placed by IR 3/26, will be primary access for HD on discharge  - Home meds  - Pain control  - Enema BID  - Advance diet to full renal  - Plan for possible d/c today with HH if tolerating diet  -  PPx: SCD's/MARYBEL's/IS/SQH    Dispo: possible d/c later today with , will CTM        VTE Risk Mitigation (From admission, onward)         Ordered     heparin (porcine) injection 1,000 Units  As needed (PRN)         03/30/22 0936     heparin (porcine) injection 1,000 Units  As needed (PRN)         03/28/22 1216     heparin (porcine) injection 5,000 Units  Every 8 hours         03/24/22 7047                Mode Castillo MD  Urology  [facility] - Surgery

## 2022-03-31 NOTE — ASSESSMENT & PLAN NOTE
Kurtis Plata is an 77 y.o. male with a history of ESRD and a left renal mass who underwent left robotic retroperitoneal radical nephrectomy on 3/24/22.    - NGT removed, no n/v, tolerating clears  - Aspiration precautions  - Protonix  - Trend H/H - replaced additional 1u pRBC on 3/28, hgb responded appropriately   - EKG 3/26 w/ sinus tachycardia, suspect related to anemia  - PT/OT  - Ambulate qid  - HD per nephrology, appreciate recs  - Drains:    PD catheter - maintain, will hold off on PD for 4 weeks post-surgery   Trialysis - removed 3/26 per nephrology recs   Permacath - placed by IR 3/26, will be primary access for HD on discharge  - Home meds  - Pain control  - Enema BID  - Advance diet to full renal  - Plan for possible d/c today with HH if tolerating diet  - PPx: SCD's/MARYBEL's/IS/SQH    Dispo: possible d/c later today with HH, will CTM

## 2022-03-31 NOTE — DISCHARGE INSTRUCTIONS
What to expect with your Nephrectomy.  [facility]    After surgery  You may or may not have a drain that is shaped like a grenade and put to suction  This drain usually may or may not come out on Post op day 1. If you go home with a drain, the nurses will teach you how to record the output and you will come back 3-5 days after you leave to have the drain removed in clinic.  You will have a catheter after your surgery. It should come out the next day and you should be able to void on your own before you leave. If you are a male and on a BPH medicine, we will need to make sure you restart that the night of your surgery.  The night of surgery we expect and hope that you will:  Walk - walking helps get the bowels moving. Also after your surgery, you are at a risk for a deep venous thrombosis (which is a clot in the legs that can form by remaining inactive or still for extended periods of time) and this can travel to your lungs and make you feel short of breath. This is a very serious condition. Walking helps prevent a DVT from occurring.  Eat - you do not have to eat a whole meal, but we want to make sure you can tolerate liquid and/or solid food without nausea and vomiting  Use your incentive spirometer - this is the breathing apparatus that helps you expand your lungs. If and when you have pain you will not want to take deep breaths. But if you dont take deep breaths, you are at risk for pneumonia. The incentive spirometer will help prevent this from occurring by expanding your lungs.  Symptoms you may experience immediately post-op:  Bloating and/or shoulder pain if you had a laparoscopic procedure - when we do this operation, we fill up your abdominal cavity with gas to better help us visualize the organs and allow our instruments to fit. After the surgery, not all the air can be removed and your body will eventually absorb this small amount of air. However this can make you feel bloated. In addition, when you  sit up, the air can sit right under a muscle (the diaphragm) which has connecting nerves to the shoulders, which could explain why you have shoulder pain.  Do not expect necessarily to have gas or to have a bowel movement - this goes along with the bloating, you may feel like you want to pass gas or have a bowel movement but you cant. This is normal and you will feel like this for a couple days. There are no pills to help with this. Small walks throughout the day should help with this.  Pain - your pain should be able to be controlled with medicines by mouth that we prescribe. It is important for you to tell us if you are on any pain medications at home before the surgery as you may need stronger pain meds while in the hospital.  You can go home when:  Pain is controlled with medicines by mouth  You are able to walk without difficulty or pain  You are tolerating a regulating diet  Your are voiding. If you cannot void we may have to replace a catheter and you will follow up 3-5 days after you leave to have a voiding trial. The nurses will teach you how to care for your catheter.  When you go home:  Activity  Continue to walk - small walks throughout the day are better than one long walk.   Do not lift anything greater than 8 pounds for 6 weeks - we want your abdominal wall muscles to heal.  Bowel Movements - Do not strain to have a bowel movement - the pain medicines will make you constipated. That is why we also ask you to take colace 2-3 x per day to help keep your bowels regular. If you are still having trouble, then you can also add Miralax once a day. Do not take any stool softeners if you are having diarrhea.  Drain - If you have a drain (not your catheter, but a separate drain) then record the output and bring it with you to your next appointment  Smoking - If you smoke, we encourage you STOP. Smoking interferes with the healing process and your prolong your healing with continued smoking.  Driving - Do not  drive while you are on pain meds or with your catheter in place.  Bathing - If you do not have a drain, you can shower 48 hours after your surgery. If you do have a drain, sponge bathe only until the drain is out.  Dressing - you can remove the dressings if there is no drainage or change them as needed if there is. The little sterile band-aid strips will fall off on their own in 10-14 days. If they have not fallen off then you can remove them yourself.  Restarting medicines -especially blood thinners (Aspirin, Plavix, Coumadin), Fish Oil. Discuss this with your physician prior to discharge.  When to return to the ER  Fever - If you have a fever >101.5, this could be due to a number of reasons such as infection of the urine or incision. If your catheter has been removed, you could possibly have a leak. It would be best to come to the ER so they can better evaluate you.  Severe pain - pain is expected, but severe or new onset of pain is not normal.   Inability to tolerate food or liquid with nausea and vomiting - it would be best to go to the ER for them to better evaluate you.

## 2022-03-31 NOTE — PLAN OF CARE
Team reports patient to d/c home today. HH orders sent via [facility] to [facility]. Spoke with Virginia at [place] who reports start of care for patient will be 4/1/22. Wheelchair ordered from [facility] unable to supply as patient is a [place] resident. [facility] will provide D[facility] to patient's home following discharge. Will continue to follow.      1015AM- Spoke with Deandre at [facility]. RNCM informed that patient will d/c today. Patient expected at clinic on 4/1/22 @ 9AM for HD. Updated clinicals faxed to [phone]. Will continue to follow.

## 2022-03-31 NOTE — PLAN OF CARE
Problem: Adult Inpatient Plan of Care  Goal: Plan of Care Review  Outcome: Met  Goal: Patient-Specific Goal (Individualized)  Outcome: Met  Goal: Absence of Hospital-Acquired Illness or Injury  Outcome: Met  Goal: Optimal Comfort and Wellbeing  Outcome: Met  Goal: Readiness for Transition of Care  Outcome: Met     Problem: Infection  Goal: Absence of Infection Signs and Symptoms  Outcome: Met     Problem: Device-Related Complication Risk (Hemodialysis)  Goal: Safe, Effective Therapy Delivery  Outcome: Met     Problem: Hemodynamic Instability (Hemodialysis)  Goal: Effective Tissue Perfusion  Outcome: Met     Problem: Infection (Hemodialysis)  Goal: Absence of Infection Signs and Symptoms  Outcome: Met     Problem: Fall Injury Risk  Goal: Absence of Fall and Fall-Related Injury  Outcome: Met     Problem: Skin Injury Risk Increased  Goal: Skin Health and Integrity  Outcome: Met

## 2022-04-01 ENCOUNTER — PATIENT OUTREACH (OUTPATIENT)
Dept: ADMINISTRATIVE | Facility: CLINIC | Age: 77
End: 2022-04-01
Payer: MEDICARE

## 2022-04-01 NOTE — PLAN OF CARE
[facility]  Discharge Final Note    Primary Care Provider: Anmol Joe MD    Expected Discharge Date: 3/31/2022    Final Discharge Note (most recent)     Final Note - 04/01/22 0721        Final Note    Assessment Type Final Discharge Note     Anticipated Discharge Disposition Home-Health Care [facility] [place] [facility]     Hospital Resources/Appts/Education Provided Appointments scheduled and added to AVS        Post-Acute Status    Post-Acute Authorization Home Health     Home Health Status Set-up Complete/Auth obtained                 Important Message from Medicare  Important Message from Medicare regarding Discharge Appeal Rights: Given to patient/caregiver, Explained to patient/caregiver, Signed/date by patient/caregiver     Date IMM was signed: 03/31/22  Time IMM was signed: 0927    Contact Info     Janes Ochoa MD   Specialty: Urology    [address]  [facility] [address]   Phone: [phone]       Next Steps: Follow up on 4/20/2022    Instructions: Post-op    Bessy Puri MD   Specialty: Internal Medicine    [address]  [facility] [address]   Phone: [phone]       Next Steps: Follow up    Instructions: Hospital follow-up scheduled for 4/7/22 @ 1030    [facility] - Dme   Specialty: DME Provider, Home [facility]    [address] [place] [address]   Phone: [phone]       Next Steps: Follow up            Future Appointments   Date Time Provider Department Center   4/7/2022 10:30 AM Bessy Puri MD [facility]   4/20/2022 10:30 AM Janes Ochoa MD [facility]     Patient discharged home on 3/31/22 to care of family and [facility]

## 2022-04-01 NOTE — PROGRESS NOTES
C3 nurse spoke with Kurtis Plata's daughter for a TCC post hospital discharge follow up call. The patient has a scheduled [facility] appointment with Dr. Puri on 4/7/2022 @ 10:30 am.

## 2022-04-06 LAB
FINAL PATHOLOGIC DIAGNOSIS: NORMAL
GROSS: NORMAL
Lab: NORMAL
MICROSCOPIC EXAM: NORMAL
SUPPLEMENTAL DIAGNOSIS: NORMAL

## 2022-04-27 ENCOUNTER — OFFICE VISIT (OUTPATIENT)
Dept: UROLOGY | Facility: CLINIC | Age: 77
End: 2022-04-27
Payer: MEDICARE

## 2022-04-27 VITALS — SYSTOLIC BLOOD PRESSURE: 153 MMHG | HEART RATE: 74 BPM | DIASTOLIC BLOOD PRESSURE: 83 MMHG

## 2022-04-27 DIAGNOSIS — N18.6 ESRD ON PERITONEAL DIALYSIS: ICD-10-CM

## 2022-04-27 DIAGNOSIS — Z99.2 ESRD ON PERITONEAL DIALYSIS: ICD-10-CM

## 2022-04-27 DIAGNOSIS — C64.2 RENAL CELL CARCINOMA OF LEFT KIDNEY: Primary | ICD-10-CM

## 2022-04-27 PROCEDURE — 99024 PR POST-OP FOLLOW-UP VISIT: ICD-10-PCS | Mod: S$GLB,,, | Performed by: UROLOGY

## 2022-04-27 PROCEDURE — 99024 POSTOP FOLLOW-UP VISIT: CPT | Mod: S$GLB,,, | Performed by: UROLOGY

## 2022-04-27 PROCEDURE — 3079F DIAST BP 80-89 MM HG: CPT | Mod: CPTII,S$GLB,, | Performed by: UROLOGY

## 2022-04-27 PROCEDURE — 1126F AMNT PAIN NOTED NONE PRSNT: CPT | Mod: CPTII,S$GLB,, | Performed by: UROLOGY

## 2022-04-27 PROCEDURE — 1159F MED LIST DOCD IN RCRD: CPT | Mod: CPTII,S$GLB,, | Performed by: UROLOGY

## 2022-04-27 PROCEDURE — 1160F RVW MEDS BY RX/DR IN RCRD: CPT | Mod: CPTII,S$GLB,, | Performed by: UROLOGY

## 2022-04-27 PROCEDURE — 3077F PR MOST RECENT SYSTOLIC BLOOD PRESSURE >= 140 MM HG: ICD-10-PCS | Mod: CPTII,S$GLB,, | Performed by: UROLOGY

## 2022-04-27 PROCEDURE — 1126F PR PAIN SEVERITY QUANTIFIED, NO PAIN PRESENT: ICD-10-PCS | Mod: CPTII,S$GLB,, | Performed by: UROLOGY

## 2022-04-27 PROCEDURE — 99999 PR PBB SHADOW E&M-EST. PATIENT-LVL IV: CPT | Mod: PBBFAC,,, | Performed by: UROLOGY

## 2022-04-27 PROCEDURE — 3077F SYST BP >= 140 MM HG: CPT | Mod: CPTII,S$GLB,, | Performed by: UROLOGY

## 2022-04-27 PROCEDURE — 3079F PR MOST RECENT DIASTOLIC BLOOD PRESSURE 80-89 MM HG: ICD-10-PCS | Mod: CPTII,S$GLB,, | Performed by: UROLOGY

## 2022-04-27 PROCEDURE — 1159F PR MEDICATION LIST DOCUMENTED IN MEDICAL RECORD: ICD-10-PCS | Mod: CPTII,S$GLB,, | Performed by: UROLOGY

## 2022-04-27 PROCEDURE — 99999 PR PBB SHADOW E&M-EST. PATIENT-LVL IV: ICD-10-PCS | Mod: PBBFAC,,, | Performed by: UROLOGY

## 2022-04-27 PROCEDURE — 1160F PR REVIEW ALL MEDS BY PRESCRIBER/CLIN PHARMACIST DOCUMENTED: ICD-10-PCS | Mod: CPTII,S$GLB,, | Performed by: UROLOGY

## 2022-04-27 RX ORDER — MEGESTROL ACETATE 40 MG/ML
10 SUSPENSION ORAL
COMMUNITY
Start: 2021-06-28

## 2022-04-27 RX ORDER — ACETAMINOPHEN 325 MG/1
2 TABLET ORAL
COMMUNITY
Start: 2022-03-30

## 2022-04-27 NOTE — PROGRESS NOTES
"  Subjective:       Patient ID: Kurtis Plata is a 77 y.o. male.    Chief Complaint:  Post-op      History of Present Illness  HPI  Patient is a 77 y.o. male who is established to our clinic and was referred by Mady Mederos NP for evaluation of a renal mass.     Also has ESRD, 2/2 HTN.  Was on peritoneal dialysis prior to his kidney surgery but transiently transitioned to HD perioperatively.   He underwent a left retroperitoneal robotic nephrectomy on 3/24/22.  He returns to review pathology results. He is recovering well.  He is feeling stronger more and more.  He is urinating well. No hematuria.  No other complaints.      Father had kidney cancer (had surgery for this, passed from "old-age" unrelated to kidney cancer), brother colon CA, niece w/breast CA.    He sees Dr. Shari Mcknight in [place] as his nephrologist.     PATHOLOGY:    Final Pathologic Diagnosis Left kidney, left nephrectomy:   Papillary renal cell carcinoma, type 1, WHO/ISUP Grade 2, 5 cm in size by   gross dimension, limited to kidney   Surgical margins uninvolved   Tumor necrosis present - approximately 10% by microscopic estimation   Please see complete surgical pathology cancer case summary below   PATHOLOGIC TNM STAGING: pT1b   Surgical Pathology Cancer Case Summary   Procedure - Total nephrectomy   Specimen laterality - left   Tumor focality - Unifocal   Tumor site - lower pole by gross description   Tumor size - 5 x 4.7 x 4.3 cm by gross description   Histologic type - Papillary renal cell carcinoma, type 1   Histologic grade (WHO/ISUP) - G2 (nucleoli conspicuous and eosinophilic at   400x magnification, visible but not prominent at 100x magnification)   Tumor Extent - limited to kidney   Sarcomatoid features - Not identified   Rhabdoid features - Not identified   Tumor necrosis - present, approximately 10% by microscopic estimation   Lymphovascular invasion - not identified   Margins - All margins negative for invasive carcinoma "   Regional lymph nodes   Regional lymph node status- Not applicable (no regional lymph nodes submitted   or found)   Pathologic Stage Classification   Primary tumor (pT) - pT1b: Tumor greater than 4 cm but less than or equal to   7 cm in greatest dimension, limited to the kidney   Regional lymph nodes (pN)- pN not assigned (no nodes submitted or found)   Additional findings in nonneoplastic kidney:   Chronic tubulointerstitial nephritis with fibrosis   Scattered global glomerulosclerosis and arteriolosclerosis   Additional findings - Tubular (papillary) adenoma 9 mm             Review of Systems  Review of Systems  All other systems reviewed and negative except pertinent positives noted in HPI.       Objective:     Physical Exam  Constitutional:       General: He is not in acute distress.     Appearance: He is well-developed.   HENT:      Head: Normocephalic and atraumatic.   Eyes:      General: No scleral icterus.  Neck:      Trachea: No tracheal deviation.   Pulmonary:      Effort: Pulmonary effort is normal. No respiratory distress.   Abdominal:       Neurological:      Mental Status: He is alert and oriented to person, place, and time.   Psychiatric:         Behavior: Behavior normal.         Thought Content: Thought content normal.         Judgment: Judgment normal.         Lab Review  No results found for: COLORU, SPECGRAV, PHUR, WBCUR, NITRITE, PROTEINUR, GLUCOSEUR, KETONESU, UROBILINOGEN, BILIRUBINUR, RBCUR      Assessment:        1. Renal cell carcinoma of left kidney    2. ESRD on peritoneal dialysis            Plan:     Renal cell carcinoma of left kidney  -     MRI Abdomen Without Contrast; Future; Expected date: 04/27/2023  -     X-Ray Chest PA And Lateral; Future; Expected date: 04/27/2023    ESRD on peritoneal dialysis         -plan for MRI abd, CXR in 1 year.  -f/u in 1 year to review imaging.   -continue HD until PD approved by nephrology.

## 2022-07-06 ENCOUNTER — TELEPHONE (OUTPATIENT)
Dept: SURGERY | Facility: CLINIC | Age: 77
End: 2022-07-06
Payer: MEDICARE

## 2022-07-06 NOTE — TELEPHONE ENCOUNTER
----- Message from CANDIDO Urbina ANP-C sent at 7/5/2022  4:03 PM CDT -----  Regarding: RE: 6 month GIST surveillance, MRI abd; on dialysis  His last MRI was done 12/2021, he should have [id number] surveillance so he is due now.   I see an active order for MRI under Dr. Juarez. Please schedule and notify him/ daughter.   He can do VV for results if he wants since lives in [place], [place].     ----- Message -----  From: Aury Jose MA  Sent: 7/1/2022   9:44 AM CDT  To: CANDIDO Urbina,ANP-C  Subject: FW: 6 month GIST surveillance, MRI abd; on d#    Please place orders. He will have an MRI schedule by Urology in 4/23. Should we proceed with MRI before then?  ----- Message -----  From: CANDIDO Urbina ANP-C  Sent: 6/24/2022  10:10 AM CDT  To: Humphrey NOEL Staff  Subject: 6 month GIST surveillance, MRI abd; on dialy#    He last had imaging and VV in 12/2021. Due for 6 month surveillance. Please schedule with him (or his daughter)

## 2022-07-20 ENCOUNTER — TELEPHONE (OUTPATIENT)
Dept: SURGERY | Facility: CLINIC | Age: 77
End: 2022-07-20
Payer: MEDICARE

## 2022-07-20 NOTE — TELEPHONE ENCOUNTER
Attempted to contact daughter re:scheduling of 6 month GIST surveillance and MRI abd  per MOUSTAPHA Mederos NP. No answer. Left detailed message along with direct callback number to return call.

## 2022-08-11 ENCOUNTER — TELEPHONE (OUTPATIENT)
Dept: SURGERY | Facility: CLINIC | Age: 77
End: 2022-08-11
Payer: MEDICARE

## 2022-08-11 NOTE — TELEPHONE ENCOUNTER
Called pt's daughter to schedule follow up appointment with Dr. Juarez. No answer, left detailed message with contact info.

## 2022-08-11 NOTE — TELEPHONE ENCOUNTER
----- Message from Lyndsay Thompson, JAYNE sent at 7/20/2022 12:57 PM CDT -----  Regarding: FW: 6 month GIST surveillance, MRI abd; on PD    ----- Message -----  From: CANDIDO Urbina ANP-C  Sent: 7/20/2022  12:25 PM CDT  To: Humphrey NOEL Staff  Subject: FW: 6 month GIST surveillance, MRI abd; on PD    Please f/u on this, Mark spoke with the daughter on 7/6.     ----- Message -----  From: CANDIDO Urbina ANP-C  Sent: 7/20/2022  12:00 AM CDT  To: CANDIDO Urbina ANP-C  Subject: 6 month GIST surveillance, MRI abd; on PD

## (undated) DEVICE — COVER LIGHT HANDLE

## (undated) DEVICE — SYR SLIP TIP 20CC

## (undated) DEVICE — DRAPE ARM DAVINCI XI

## (undated) DEVICE — DISSECTOR SPACEMAKER + 10-12MM

## (undated) DEVICE — APPLIER LAPRATY SUT CLIP 33CM

## (undated) DEVICE — GOWN SURGICAL X-LARGE

## (undated) DEVICE — SUT MCRYL PLUS 4-0 PS2 27IN

## (undated) DEVICE — CANNULA REDUCER 12-8MM

## (undated) DEVICE — SUT 2/0 27IN PDS II VIO MO

## (undated) DEVICE — SCISSOR 5MMX35CM DIRECT DRIVE

## (undated) DEVICE — SUT PROLENE 4-0 RB-1 BL MO

## (undated) DEVICE — SOL ELECTROLUBE ANTI-STIC

## (undated) DEVICE — SPONGE IV DRAIN 4X4 STERILE

## (undated) DEVICE — BAG INZII TISS RETRV 12/15MM

## (undated) DEVICE — TRAY FOLEY 16FR INFECTION CONT

## (undated) DEVICE — ELECTRODE REM PLYHSV RETURN 9

## (undated) DEVICE — DRAPE COLUMN DAVINCI XI

## (undated) DEVICE — PORT AIRSEAL 12/120MM LPI

## (undated) DEVICE — SOL NS 1000CC

## (undated) DEVICE — IRRIGATOR ENDOSCOPY DISP.

## (undated) DEVICE — SOL CLEARIFY VISUALIZATION LAP

## (undated) DEVICE — STAPLER INT LINEAR ARTC 3.5-45

## (undated) DEVICE — TRAY MINOR GEN SURG

## (undated) DEVICE — DRAPE ABDOMINAL TIBURON 14X11

## (undated) DEVICE — COVER TIP CURVED SCISSORS XI

## (undated) DEVICE — DRAPE SCOPE PILLOW WARMER

## (undated) DEVICE — TAPE SILK 3IN

## (undated) DEVICE — ADHESIVE DERMABOND ADVANCED

## (undated) DEVICE — CART STAPLE RELD 45MM WHT

## (undated) DEVICE — CLIP HEMO-LOK MLX LARGE LF

## (undated) DEVICE — NDL INSUF ULTRA VERESS 120MM

## (undated) DEVICE — SEAL UNIVERSAL 5MM-8MM XI

## (undated) DEVICE — KIT PROCEDURE STER INLET CLOSU

## (undated) DEVICE — SET TRI-LUMEN FILTERED TUBE